# Patient Record
Sex: FEMALE | Race: WHITE | NOT HISPANIC OR LATINO | Employment: FULL TIME | URBAN - METROPOLITAN AREA
[De-identification: names, ages, dates, MRNs, and addresses within clinical notes are randomized per-mention and may not be internally consistent; named-entity substitution may affect disease eponyms.]

---

## 2019-04-16 ENCOUNTER — PROCEDURE VISIT (OUTPATIENT)
Dept: OBSTETRICS AND GYNECOLOGY | Facility: CLINIC | Age: 39
End: 2019-04-16

## 2019-04-16 ENCOUNTER — OFFICE VISIT (OUTPATIENT)
Dept: OBSTETRICS AND GYNECOLOGY | Facility: CLINIC | Age: 39
End: 2019-04-16

## 2019-04-16 VITALS
HEIGHT: 68 IN | SYSTOLIC BLOOD PRESSURE: 132 MMHG | BODY MASS INDEX: 36.07 KG/M2 | DIASTOLIC BLOOD PRESSURE: 80 MMHG | WEIGHT: 238 LBS

## 2019-04-16 DIAGNOSIS — Z11.51 SPECIAL SCREENING EXAMINATION FOR HUMAN PAPILLOMAVIRUS (HPV): ICD-10-CM

## 2019-04-16 DIAGNOSIS — R10.2 PELVIC PAIN: ICD-10-CM

## 2019-04-16 DIAGNOSIS — Z30.011 ENCOUNTER FOR INITIAL PRESCRIPTION OF CONTRACEPTIVE PILLS: ICD-10-CM

## 2019-04-16 DIAGNOSIS — R10.2 PELVIC PAIN: Primary | ICD-10-CM

## 2019-04-16 DIAGNOSIS — E28.2 PCOS (POLYCYSTIC OVARIAN SYNDROME): ICD-10-CM

## 2019-04-16 DIAGNOSIS — L70.8 OTHER ACNE: ICD-10-CM

## 2019-04-16 DIAGNOSIS — E66.09 CLASS 2 OBESITY DUE TO EXCESS CALORIES WITHOUT SERIOUS COMORBIDITY WITH BODY MASS INDEX (BMI) OF 36.0 TO 36.9 IN ADULT: ICD-10-CM

## 2019-04-16 DIAGNOSIS — Z01.419 PAP SMEAR, LOW-RISK: Primary | ICD-10-CM

## 2019-04-16 DIAGNOSIS — Z01.419 ENCOUNTER FOR WELL WOMAN EXAM: ICD-10-CM

## 2019-04-16 LAB
B-HCG UR QL: NEGATIVE
BILIRUB BLD-MCNC: NEGATIVE MG/DL
CLARITY, POC: CLEAR
COLOR UR: YELLOW
GLUCOSE UR STRIP-MCNC: NEGATIVE MG/DL
INTERNAL NEGATIVE CONTROL: NEGATIVE
INTERNAL POSITIVE CONTROL: POSITIVE
KETONES UR QL: NEGATIVE
LEUKOCYTE EST, POC: NEGATIVE
Lab: NORMAL
NITRITE UR-MCNC: NEGATIVE MG/ML
PH UR: 5 [PH] (ref 5–8)
PROT UR STRIP-MCNC: NEGATIVE MG/DL
RBC # UR STRIP: NEGATIVE /UL
SP GR UR: 1 (ref 1–1.03)
UROBILINOGEN UR QL: NORMAL

## 2019-04-16 PROCEDURE — 81025 URINE PREGNANCY TEST: CPT | Performed by: OBSTETRICS & GYNECOLOGY

## 2019-04-16 PROCEDURE — 81002 URINALYSIS NONAUTO W/O SCOPE: CPT | Performed by: OBSTETRICS & GYNECOLOGY

## 2019-04-16 PROCEDURE — 99386 PREV VISIT NEW AGE 40-64: CPT | Performed by: OBSTETRICS & GYNECOLOGY

## 2019-04-16 PROCEDURE — 76830 TRANSVAGINAL US NON-OB: CPT | Performed by: OBSTETRICS & GYNECOLOGY

## 2019-04-16 RX ORDER — NORGESTIMATE AND ETHINYL ESTRADIOL 0.25-0.035
1 KIT ORAL DAILY
Qty: 1 PACKAGE | Refills: 11 | Status: SHIPPED | OUTPATIENT
Start: 2019-04-16 | End: 2020-12-18 | Stop reason: SINTOL

## 2019-04-16 RX ORDER — CETIRIZINE HYDROCHLORIDE 10 MG/1
10 TABLET ORAL DAILY
Status: ON HOLD | COMMUNITY
End: 2022-04-21

## 2019-04-16 RX ORDER — FLUOXETINE 10 MG/1
10 CAPSULE ORAL DAILY
COMMUNITY
End: 2020-12-18

## 2019-04-16 NOTE — PROGRESS NOTES
"GYN Annual Exam     CC- Here for annual exam.     Pt new to practice? yes  Pt new to me? yes     HPI: History of Present Illness      Tamara Holstein is a 38 y.o. female who presents for annual well woman exam. Patient's last menstrual period was 2019. Pt needs annual, pap, mammo.  Pt has hx PCOS and is having pelvic pain.  \"Something is not right\"    MAMMOGRAM UP TO DATE IF AGE APPROPRIATE?  no    COLONOSCOPY UP TO DATE IF AGE APPROPRIATE? no    Fhx breast cancer? Mother, maternal grandmother    Fhx ovarian cancer? no    Fhx colon cancer? no    Invitae testing offered? Yes, pt declined.       PMHX:  Patient Active Problem List   Diagnosis   • Obesity due to excess calories   • Other acne   • PCOS (polycystic ovarian syndrome)   • Pelvic pain   • Encounter for initial prescription of contraceptive pills   ; otherwise none    OB History      Para Term  AB Living    0 0 0 0 0 0    SAB TAB Ectopic Molar Multiple Live Births    0 0 0 0 0 0            History reviewed. No pertinent past medical history.    History reviewed. No pertinent surgical history.      Current Outpatient Medications:   •  FLUoxetine (PROzac) 10 MG capsule, Take 10 mg by mouth Daily., Disp: , Rfl:   •  cetirizine (zyrTEC) 10 MG tablet, Take 10 mg by mouth Daily., Disp: , Rfl:   •  norgestimate-ethinyl estradiol (SPRINTEC 28) 0.25-35 MG-MCG per tablet, Take 1 tablet by mouth Daily., Disp: 1 package, Rfl: 11    Allergies   Allergen Reactions   • Sulfa Antibiotics Hives       Social History     Tobacco Use   • Smoking status: Not on file   Substance Use Topics   • Alcohol use: Not on file   • Drug use: Not on file       I advised Jo of the risks of continuing to use tobacco, and I provided her with tobacco cessation educational materials in the After Visit Summary.           History reviewed. No pertinent family history.    Review of Systems        EXAM:  /80   Ht 172.7 cm (68\")   Wt 108 kg (238 lb)   LMP 2019   " Breastfeeding? No   BMI 36.19 kg/m²     Physical Exam   Constitutional: She is oriented to person, place, and time. She appears well-developed and well-nourished.   HENT:   Head: Normocephalic and atraumatic.   Neck: Normal range of motion. Neck supple. No thyromegaly present.   Cardiovascular: Normal rate and regular rhythm.   Pulmonary/Chest: Effort normal and breath sounds normal. Right breast exhibits no mass and no nipple discharge. Left breast exhibits no mass and no nipple discharge. Breasts are symmetrical. There is no breast swelling.   Abdominal: Soft. Bowel sounds are normal. She exhibits no distension and no mass. There is no tenderness. There is no rebound and no guarding.   Genitourinary: Vagina normal and uterus normal. No breast tenderness, discharge or bleeding. Pelvic exam was performed with patient prone. There is no lesion on the right labia. There is no lesion on the left labia. Cervix exhibits no motion tenderness and no discharge. Right adnexum displays no mass. Left adnexum displays no mass.   Genitourinary Comments: cx wnl, pap done   Musculoskeletal: Normal range of motion. She exhibits no edema.   Neurological: She is alert and oriented to person, place, and time.   Skin: Skin is warm and dry.   Breasts wnl bilaterally   Psychiatric: She has a normal mood and affect. Her behavior is normal. Judgment and thought content normal.   Nursing note and vitals reviewed.         As part of wellness and prevention, the following topics were discussed with the patient:  []  Nutrition  []  Physical activity/regular exercise   [x]  Healthy weight  []  Injury prevention  [x]  Substance misuse/abuse  []  Sexual behavior  []  STD prevention  []  Contaception  []  Dental health  [x]  Mental health  []  Immunization  [x]  Encouraged SBE     Counseling and guidance done:  Nutrition, physical activity, healthy weight, injury prevention, misuse of tobacco, alcohol and drugs, sexual behavior and STDs,  contraception, dental health, mental health, immunizations breast cancer screening and exams.    Assessment     1) GYN annual well woman exam.   2) PAP done today? yes  3) problems addressed: pelvic pain       Plan       Follow up prn and one year.    Jo was seen today for gynecologic exam.    Diagnoses and all orders for this visit:    Pap smear, low-risk  -     POC Urinalysis Dipstick  -     POC Pregnancy, Urine  -     Pap IG, HPV-hr    Special screening examination for human papillomavirus (HPV)  -     POC Urinalysis Dipstick  -     POC Pregnancy, Urine  -     Pap IG, HPV-hr    Encounter for well woman exam  -     POC Urinalysis Dipstick  -     POC Pregnancy, Urine  -     Pap IG, HPV-hr  -     Mammo Screening Digital Tomosynthesis Bilateral With CAD; Future    Class 2 obesity due to excess calories without serious comorbidity with body mass index (BMI) of 36.0 to 36.9 in adult    Other acne    PCOS (polycystic ovarian syndrome)    Pelvic pain    Encounter for initial prescription of contraceptive pills    Other orders  -     norgestimate-ethinyl estradiol (SPRINTEC 28) 0.25-35 MG-MCG per tablet; Take 1 tablet by mouth Daily.      U/s wnl.  Reviewed with pt: The uterus is retroverted and appears normal in shape and contour.  The EL measures 1.2cm.  Both ovaries are seen and appear normal in size and shape. There are small (<1cm) simple cyst seen within both  ovaries. ?polycystic     will start OCs for pelvic pain and ovulation suppression and acne and treatment of PCOS.  Instructions and precautions given.     RTO Return in about 1 year (around 4/16/2020) for Annual physical.      Yogi Patten MD  [unfilled]  12:05 PM

## 2019-04-19 LAB
CYTOLOGIST CVX/VAG CYTO: ABNORMAL
CYTOLOGY CVX/VAG DOC THIN PREP: ABNORMAL
DX ICD CODE: ABNORMAL
DX ICD CODE: ABNORMAL
HIV 1 & 2 AB SER-IMP: ABNORMAL
HPV I/H RISK 1 DNA CVX QL PROBE+SIG AMP: POSITIVE
OTHER STN SPEC: ABNORMAL
PATH REPORT.FINAL DX SPEC: ABNORMAL
PATHOLOGIST CVX/VAG CYTO: ABNORMAL
RECOM F/U CVX/VAG CYTO: ABNORMAL
STAT OF ADQ CVX/VAG CYTO-IMP: ABNORMAL

## 2019-04-25 PROBLEM — R87.610 ASCUS WITH POSITIVE HIGH RISK HPV CERVICAL: Status: ACTIVE | Noted: 2019-04-25

## 2019-04-25 PROBLEM — R87.810 ASCUS WITH POSITIVE HIGH RISK HPV CERVICAL: Status: ACTIVE | Noted: 2019-04-25

## 2019-10-18 ENCOUNTER — HOSPITAL ENCOUNTER (OUTPATIENT)
Dept: PHYSICAL THERAPY | Facility: HOSPITAL | Age: 39
Setting detail: THERAPIES SERIES
Discharge: HOME | End: 2019-10-18

## 2019-10-18 DIAGNOSIS — M72.2 PLANTAR FASCIITIS OF LEFT FOOT: Primary | ICD-10-CM

## 2019-10-18 PROCEDURE — 97161 PT EVAL LOW COMPLEX 20 MIN: CPT

## 2019-10-18 PROCEDURE — 97110 THERAPEUTIC EXERCISES: CPT

## 2019-10-18 NOTE — THERAPY EVALUATION
Outpatient Physical Therapy Ortho Initial Evaluation  [facility]     Patient Name: Tamara Holstein  : 1980  MRN: [id number]  Today's Date: 10/18/2019      Visit Date: 10/18/2019    Patient Active Problem List   Diagnosis   • Obesity due to excess calories   • Other acne   • PCOS (polycystic ovarian syndrome)   • Pelvic pain   • Encounter for initial prescription of contraceptive pills   • ASCUS with positive high risk HPV cervical        No past medical history on file.     No past surgical history on file.    Visit Dx:     ICD-10-CM ICD-9-CM   1. Plantar fasciitis of left foot M72.2 728.71         Patient History     Row Name 10/18/19 1200             History    Chief Complaint  Difficulty Walking;Difficulty with daily activities;Joint stiffness;Joint swelling;Muscle tenderness;Muscle weakness;Pain  -AS      Type of Pain  Ankle pain;Foot pain Left  -AS      Date Current Problem(s) Began  19  -AS      Brief Description of Current Complaint  Patient states she started having left heel and foot pain about 4 months ago. She states she limped around for several months before going to see her MD. She had an MRI and a tear in her plantar fascia was found around its attachment site on the calcaneus. She was placed in a walking boot and referred to PT. She returns to her MD on 19.   -AS      Patient's Rating of General Health  Good  -AS      Hand Dominance  right-handed  -AS      Occupation/sports/leisure activities  Desk Job, Working Out  -AS      Patient seeing anyone else for problem(s)?  Dr. Cruz  -AS      How has patient tried to help current problem?  rest, exercises, walking boot, Advil  -AS      What clinical tests have you had for this problem?  MRI  -AS      Results of Clinical Tests  tear of plantar fascia of left foot  -AS         Pain     Pain Location  Foot  -AS      Pain at Present  4  -AS      Pain at Best  0  -AS      Pain at Worst  8  -AS      Pain Frequency  Intermittent  -AS       Pain Description  Aching  -AS      What Performance Factors Make the Current Problem(s) WORSE?  walking  -AS      What Performance Factors Make the Current Problem(s) BETTER?  rest  -AS         Daily Activities    Primary Language  English  -AS      Are you able to read  Yes  -AS      Are you able to write  Yes  -AS      How does patient learn best?  Listening;Reading  -AS      Teaching needs identified  Home Exercise Program;Management of Condition  -AS      Patient is concerned about/has problems with  Difficulty with self care (i.e. bathing, dressing, toileting:;Flexibility;Performing home management (household chores, shopping, care of dependents);Performing job responsibilities/community activities (work, school,;Performing sports, recreation, and play activities;Walking  -AS      Does patient have problems with the following?  None  -AS      Barriers to learning  None  -AS      Pt Participated in POC and Goals  Yes  -AS         Safety    Are you being hurt, hit, or frightened by anyone at home or in your life?  No  -AS      Are you being neglected by a caregiver  No  -AS        User Key  (r) = Recorded By, (t) = Taken By, (c) = Cosigned By    Initials Name Provider Type    AS Mason Delarosa, PT Physical Therapist          PT Ortho     Row Name 10/18/19 1200       Precautions and Contraindications    Precautions/Limitations  no known precautions/limitations  -AS       Posture/Observations    Posture- WNL  Posture is WNL  -AS       Foot/Ankle Palpation    Plantar Fascia  Left:;Tender  -AS    Achilles' Tendon  Left:;Tender  -AS    Posterior Tibialis  Left:;Tender  -AS       Left Lower Ext    Lt Ankle Dorsiflexion AROM  8  -AS    Lt Ankle Plantarflexion AROM  45  -AS    Lt Ankle Inversion AROM  30  -AS    Lt Ankle Eversion AROM  11  -AS       MMT Left Lower Ext    Lt Ankle Plantarflexion MMT, Gross Movement  (4/5) good  -AS    Lt Ankle Dorsiflexion MMT, Gross Movement  (4+/5) good plus  -AS    Lt Ankle  Subtalar Inversion MMT, Gross Movement  (4-/5) good minus  -AS    Lt Ankle Subtalar Eversion MMT, Gross Movement  (4/5) good  -AS       Sensation    Sensation WNL?  WNL  -AS    Light Touch  No apparent deficits  -AS       Lower Extremity Flexibility    Gastrocnemius  Left:;Mildly limited  -AS    Soleus  Left:;Moderately limited  -AS       Gait/Stairs Assessment/Training    Comment (Gait/Stairs)  Patient is ambulating with a walking boot on her LLE. Patient is ambulating with an antalgic gait pattern with limited heel strike, shortened step and stride length on her left side.  -AS      User Key  (r) = Recorded By, (t) = Taken By, (c) = Cosigned By    Initials Name Provider Type    AS Mason Delarosa, PT Physical Therapist                      Therapy Education  Given: HEP, Symptoms/condition management, Pain management, Edema management  Program: New  How Provided: Verbal, Demonstration, Written  Provided to: Patient  Level of Understanding: Teach back education performed, Verbalized, Demonstrated     PT OP Goals     Row Name 10/18/19 1200          PT Short Term Goals    STG Date to Achieve  11/01/19  -AS     STG 1  Patient to demonstrate compliance with her initial HEP for flexibility, ROM, and strengthening.  -AS     STG 2  Patient to report left foot and heel pain on VAS of 4-5/10 at worst with activity.  -AS     STG 3  Patient to demonstrate improved left ankle strength to 4+/5 in all planes.  -AS     STG 4  Patient to ambulate community distances with a tennis shoe and a normal heel to toe gait pattern.  -AS        Long Term Goals    LTG Date to Achieve  11/15/19  -AS     LTG 1  Patient to demonstrate compliance with her advanced HEP for flexibility, ROM, and strengthening.  -AS     LTG 2  Patient to report left foot and heel pain on VAS of 0-1/10 at worst with activity.  -AS     LTG 3  Patient to demonstrate improved left ankle strength to 5/5 in all planes.  -AS     LTG 4  Patient to demonstrate improved  left ankle ROM to WNL in all planes.  -AS     LTG 5  Patient to report improved function and decreased pain on LEFS by >10-15 points.  -AS        Time Calculation    PT Goal Re-Cert Due Date  11/15/19  -AS       User Key  (r) = Recorded By, (t) = Taken By, (c) = Cosigned By    Initials Name Provider Type    AS Mason eDlarosa, PT Physical Therapist          PT Assessment/Plan     Row Name 10/18/19 1200          PT Assessment    Functional Limitations  Impaired gait;Impaired locomotion;Limitation in home management;Limitations in community activities;Performance in leisure activities;Performance in sport activities;Performance in work activities  -AS     Impairments  Gait;Impaired flexibility;Joint mobility;Muscle strength;Pain;Range of motion  -AS     Assessment Comments  Patient reports to outpatient PT with a diagnosis of left plantar fasciitis. Patient is wearing walking boot on her left foot. Patient has limited left ankle ROM, limited left foot/ankle strength, and increased foot and heel pain with activity, especially with her first steps in the morning. Patient has limited function at this time secondary to the above.  -AS     Please refer to paper survey for additional self-reported information  Yes  -AS     Rehab Potential  Good  -AS     Patient/caregiver participated in establishment of treatment plan and goals  Yes  -AS     Patient would benefit from skilled therapy intervention  Yes  -AS        PT Plan    PT Frequency  2x/week  -AS     Predicted Duration of Therapy Intervention (Therapy Eval)  3-4 weeks  -AS     Planned CPT's?  PT RE-EVAL: 19234;PT THER PROC EA 15 MIN: 64154;PT NEUROMUSC RE-EDUCATION EA 15 MIN: 13914;PT THER ACT EA 15 MIN: 02118;PT MANUAL THERAPY EA 15 MIN: 25371;PT GAIT TRAINING EA 15 MIN: 15799;PT ELECTRICAL STIM UNATTEND: ;PT ULTRASOUND EA 15 MIN: 69144;PT HOT/COLD PACK WC NONMCARE: 27447;PT IONTOPHORESIS EA 15 MIN: 14731  -AS       User Key  (r) = Recorded By, (t) = Taken  By, (c) = Cosigned By    Initials Name Provider Type    AS Mason Delarosa, PT Physical Therapist          Modalities     Row Name 10/18/19 1200             Ultrasound 58182    Location  Plantar Fascia  -AS      Duty Cycle  50  -AS      Frequency  3.0 MHz  -AS      Intensity - Wts/cm  1.5  -AS         Iontophoresis 29091    Milliamps  40  -AS      MA/Min  4  -AS      Dexamethasone used  Yes  -AS      Patch Type  Large  -AS        User Key  (r) = Recorded By, (t) = Taken By, (c) = Cosigned By    Initials Name Provider Type    AS Mason Delarosa, PT Physical Therapist        OP Exercises     Row Name 10/18/19 1200             Subjective Pain    Able to rate subjective pain?  yes  -AS      Pre-Treatment Pain Level  4  -AS      Post-Treatment Pain Level  3  -AS         Exercise 1    Exercise Name 1  NWB Gastroc/Soleus Stretch  -AS      Reps 1  10  -AS      Time 1  10 sec hold each  -AS         Exercise 2    Exercise Name 2  Seated Great Toe Stretch  -AS      Reps 2  10  -AS      Time 2  10 sec hold each  -AS         Exercise 3    Exercise Name 3  Pro Stretch  -AS      Time 3  3 min  -AS         Exercise 4    Exercise Name 4  Ankle PF vs Band  -AS      Reps 4  25  -AS      Additional Comments  Gold  -AS         Exercise 5    Exercise Name 5  Ankle IV vs Band  -AS      Reps 5  25  -AS      Additional Comments  Green  -AS         Exercise 6    Exercise Name 6  Towel Curls  -AS      Time 6  5 min  -AS      Additional Comments  1#  -AS        User Key  (r) = Recorded By, (t) = Taken By, (c) = Cosigned By    Initials Name Provider Type    AS Mason Delarosa, PT Physical Therapist                        Outcome Measure Options: Lower Extremity Functional Scale (LEFS)  Lower Extremity Functional Index  Any of your usual work, housework or school activities: A little bit of difficulty  Your usual hobbies, recreational or sporting activities: A little bit of difficulty  Getting into or out of the bath: No  difficulty  Walking between rooms: Moderate difficulty  Putting on your shoes or socks: No difficulty  Squatting: A little bit of difficulty  Lifting an object, like a bag of groceries from the floor: No difficulty  Performing light activities around your home: No difficulty  Performing heavy activities around your home: Moderate difficulty  Getting into or out of a car: No difficulty  Walking 2 blocks: Moderate difficulty  Walking a mile: Quite a bit of difficulty  Going up or down 10 stairs (about 1 flight of stairs): Quite a bit of difficulty  Standing for 1 hour: Moderate difficulty  Sitting for 1 hour: No difficulty  Running on even ground: Extreme difficulty or unable to perform activity  Running on uneven ground: Extreme difficulty or unable to perform activity  Making sharp turns while running fast: Extreme difficulty or unable to perform activity  Hopping: Extreme difficulty or unable to perform activity  Rolling over in bed: No difficulty  Total: 47      Time Calculation:     Start Time: 1136  Stop Time: 1242  Time Calculation (min): 66 min     Therapy Charges for Today     Code Description Service Date Service Provider Modifiers Qty    13225258441 HC PT EVAL LOW COMPLEXITY 2 10/18/2019 Mason Delarosa, PT GP 1    76017917962 HC PT THER PROC EA 15 MIN 10/18/2019 Mason Delarosa, PT GP 2          PT G-Codes  Outcome Measure Options: Lower Extremity Functional Scale (LEFS)  Total: 47         Mason Delarosa, PT  10/18/2019

## 2019-10-21 ENCOUNTER — HOSPITAL ENCOUNTER (OUTPATIENT)
Dept: PHYSICAL THERAPY | Facility: HOSPITAL | Age: 39
Setting detail: THERAPIES SERIES
Discharge: HOME | End: 2019-10-21

## 2019-10-21 DIAGNOSIS — M72.2 PLANTAR FASCIITIS OF LEFT FOOT: Primary | ICD-10-CM

## 2019-10-21 PROCEDURE — 97110 THERAPEUTIC EXERCISES: CPT

## 2019-10-21 NOTE — THERAPY TREATMENT NOTE
Outpatient Physical Therapy [facility]o Treatment Note  [facility]     Patient Name: Tamara Holstein  : 1980  MRN: [id number]  Today's Date: 10/21/2019      Visit Date: 10/21/2019    Visit Dx:    ICD-10-CM ICD-9-CM   1. 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     No past surgical history on file.                    PT Assessment/Plan     Row Name 10/21/19 1130          PT Assessment    Assessment Comments  Pt tolerated treatment plan well with no reports of increased symptoms.   -KM        PT Plan    PT Plan Comments  Continue per POC  -KM       User Key  (r) = Recorded By, (t) = Taken By, (c) = Cosigned By    Initials Name Provider Type    Terri Paredes PTA Physical Therapy Assistant          Modalities     Row Name 10/21/19 1130             Subjective Comments    Subjective Comments  Pt states she experienced increased pain over the weekend with increased activity, but symptoms have resolved and her foot feels good today.   -KM         Ultrasound 96918    Location  Plantar Fascia  -KM      Duty Cycle  50  -KM      Frequency  3.0 MHz  -KM      Intensity - Wts/cm  1.5  -KM         Iontophoresis 85404    Milliamps  40  -KM      MA/Min  4  -KM      Dexamethasone used  Yes  -KM      Patch Type  Large  -KM        User Key  (r) = Recorded By, (t) = Taken By, (c) = Cosigned By    Initials Name Provider Type    Terri Paredes PTA Physical Therapy Assistant        OP Exercises     Row Name 10/21/19 1130             Subjective Comments    Subjective Comments  Pt states she experienced increased pain over the weekend with increased activity, but symptoms have resolved and her foot feels good today.   -KM          Exercise 1    Exercise Name 1  NWB Gastroc/Soleus Stretch  -KM      Reps 1  10  -KM      Time 1  10 sec hold each  -KM         Exercise 2    Exercise Name 2  Seated Great Toe Stretch  -KM      Reps 2  10  -KM      Time 2  10 sec hold each  -KM         Exercise 3    Exercise Name 3  Pro Stretch  -KM      Time 3  3 min  -KM         Exercise 4    Exercise Name 4  Ankle PF vs Band  -KM      Reps 4  30  -KM      Time 4  Gold  -KM         Exercise 5    Exercise Name 5  Ankle IV vs Band  -KM      Reps 5  30  -KM      Time 5  Green  -KM         Exercise 6    Exercise Name 6  Towel Curls  -KM      Time 6  5 min  -KM      Additional Comments  1#  -KM        User Key  (r) = Recorded By, (t) = Taken By, (c) = Cosigned By    Initials Name Provider Type    Terri Paredes PTA Physical Therapy Assistant                                          Time Calculation:   Start Time: 1130  Stop Time: 1225  Time Calculation (min): 55 min  Therapy Charges for Today     Code Description Service Date Service Provider Modifiers Qty    14002981153 HC PT THER PROC EA 15 MIN 10/21/2019 Terri Saenz PTA GP 1                    Terri Saenz PTA  10/21/2019

## 2019-10-24 ENCOUNTER — HOSPITAL ENCOUNTER (OUTPATIENT)
Dept: PHYSICAL THERAPY | Facility: HOSPITAL | Age: 39
Setting detail: THERAPIES SERIES
Discharge: HOME | End: 2019-10-24

## 2019-10-24 DIAGNOSIS — M72.2 PLANTAR FASCIITIS OF LEFT FOOT: Primary | ICD-10-CM

## 2019-10-24 PROCEDURE — 97110 THERAPEUTIC EXERCISES: CPT

## 2019-10-24 PROCEDURE — 97035 APP MDLTY 1+ULTRASOUND EA 15: CPT

## 2019-10-24 NOTE — THERAPY TREATMENT NOTE
Outpatient Physical Therapy [facility] Treatment Note  [facility]     Patient Name: Tamara Holstein  : 1980  MRN: [id number]  Today's Date: 10/24/2019      Visit Date: 10/24/2019    Visit Dx:    ICD-10-CM ICD-9-CM   1. 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     No past surgical history on file.                    PT Assessment/Plan     Row Name 10/24/19 1200          PT Assessment    Assessment Comments  Patient has improved tolerance to her exercises today. She continues to complain of left heel pain.  -AS        PT Plan    PT Plan Comments  Continue with current treatment plan.  -AS       User Key  (r) = Recorded By, (t) = Taken By, (c) = Cosigned By    Initials Name Provider Type    AS Mason Delarosa, PT Physical Therapist          Modalities     Row Name 10/24/19 1200             Ultrasound 63342    Location  Plantar Fascia  -AS      Duty Cycle  50  -AS      Frequency  3.0 MHz  -AS      Intensity - Wts/cm  1.5  -AS         Iontophoresis 05197    Milliamps  40  -AS      MA/Min  4  -AS      Dexamethasone used  Yes  -AS      Patch Type  Large  -AS        User Key  (r) = Recorded By, (t) = Taken By, (c) = Cosigned By    Initials Name Provider Type    AS Mason Delarosa, PT Physical Therapist        OP Exercises     Row Name 10/24/19 1200             Subjective Comments    Subjective Comments  Patient states she is feeling a little better.   -AS         Exercise 1    Exercise Name 1  NWB Gastroc/Soleus Stretch  -AS      Reps 1  10  -AS      Time 1  10 sec hold each  -AS         Exercise 2    Exercise Name 2  Seated Great Toe Stretch  -AS      Reps 2  10  -AS      Time 2  10 sec hold each  -AS          Exercise 3    Exercise Name 3  Pro Stretch  -AS      Time 3  3 min  -AS         Exercise 4    Exercise Name 4  Ankle PF vs Band  -AS      Reps 4  40  -AS      Time 4  Gold  -AS         Exercise 5    Exercise Name 5  Ankle IV vs Band  -AS      Reps 5  25  -AS      Time 5  Blue  -AS         Exercise 6    Exercise Name 6  Towel Curls  -AS      Time 6  5 min  -AS      Additional Comments  1#  -AS        User Key  (r) = Recorded By, (t) = Taken By, (c) = Cosigned By    Initials Name Provider Type    AS Mason Delarosa, PT Physical Therapist                                          Time Calculation:   Start Time: 1130  Stop Time: 1224  Time Calculation (min): 54 min  Therapy Charges for Today     Code Description Service Date Service Provider Modifiers Qty    39032788593  PT THER PROC EA 15 MIN 10/24/2019 Mason Delarosa, PT GP 1    [id number]  PT ULTRASOUND EA 15 MIN 10/24/2019 Mason Delarosa, PT GP 1                    Mason Delarosa, PT  10/24/2019

## 2019-10-30 ENCOUNTER — HOSPITAL ENCOUNTER (OUTPATIENT)
Dept: PHYSICAL THERAPY | Facility: HOSPITAL | Age: 39
Setting detail: THERAPIES SERIES
Discharge: HOME | End: 2019-10-30

## 2019-10-30 DIAGNOSIS — M72.2 PLANTAR FASCIITIS OF LEFT FOOT: Primary | ICD-10-CM

## 2019-10-30 PROCEDURE — 97110 THERAPEUTIC EXERCISES: CPT

## 2019-10-30 PROCEDURE — 97033 APP MDLTY 1+IONTPHRSIS EA 15: CPT

## 2019-10-30 NOTE — THERAPY TREATMENT NOTE
Outpatient Physical Therapy Ortho Treatment Note  [facility]     Patient Name: Tamara Holstein  : 1980  MRN: [id number]  Today's Date: 10/30/2019      Visit Date: 10/30/2019    Visit Dx:    ICD-10-CM ICD-9-CM   1. 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     No past surgical history on file.                    PT Assessment/Plan     Row Name 10/30/19 1100          PT Assessment    Assessment Comments  Patient continues to report improvements inb her symptoms.he also demonstrates improved strength as well as tolerance to her exercises.  -AS        PT Plan    PT Plan Comments  Continue with current treatment plan.  -AS       User Key  (r) = Recorded By, (t) = Taken By, (c) = Cosigned By    Initials Name Provider Type    AS Mason Delarosa, PT Physical Therapist          Modalities     Row Name 10/30/19 1100             Ultrasound 45302    Location  Plantar Fascia  -AS      Duty Cycle  50  -AS      Frequency  3.0 MHz  -AS      Intensity - Wts/cm  1.5  -AS         Iontophoresis 87529    Milliamps  40  -AS      MA/Min  4  -AS      Dexamethasone used  Yes  -AS      Patch Type  Large  -AS        User Key  (r) = Recorded By, (t) = Taken By, (c) = Cosigned By    Initials Name Provider Type    AS Mason Delarosa, PT Physical Therapist        OP Exercises     Row Name 10/30/19 1100             Subjective Comments    Subjective Comments  Patient states that her foot is feeling better but she is still in pain, especially when outside of her walking boot.  -AS         Exercise 1    Exercise Name 1  NWB Gastroc/Soleus Stretch  -AS      Reps 1  10  -AS      Time 1  10 sec hold each  -AS         Exercise 2     Exercise Name 2  Seated Great Toe Stretch  -AS      Reps 2  10  -AS      Time 2  10 sec hold each  -AS         Exercise 3    Exercise Name 3  Pro Stretch  -AS      Time 3  3 min  -AS         Exercise 4    Exercise Name 4  Ankle PF vs Band  -AS      Reps 4  40  -AS      Time 4  Gold  -AS         Exercise 5    Exercise Name 5  Ankle IV vs Band  -AS      Reps 5  30  -AS      Time 5  Blue  -AS         Exercise 6    Exercise Name 6  Towel Curls  -AS      Time 6  5 min  -AS      Additional Comments  1#  -AS        User Key  (r) = Recorded By, (t) = Taken By, (c) = Cosigned By    Initials Name Provider Type    AS Mason Delarosa, PT Physical Therapist                                          Time Calculation:   Start Time: 1057  Stop Time: 1151  Time Calculation (min): 54 min  Therapy Charges for Today     Code Description Service Date Service Provider Modifiers Qty    88748750715  PT THER PROC EA 15 MIN 10/30/2019 Mason Delarosa, PT GP 1    75643560375 HC PT IONTOPHORESIS EA 15 MIN 10/30/2019 Mason Delarosa, PT GP 1                    Mason Delarosa, PT  10/30/2019

## 2019-11-01 ENCOUNTER — HOSPITAL ENCOUNTER (OUTPATIENT)
Dept: PHYSICAL THERAPY | Facility: HOSPITAL | Age: 39
Setting detail: THERAPIES SERIES
Discharge: HOME | End: 2019-11-01

## 2019-11-01 DIAGNOSIS — M72.2 PLANTAR FASCIITIS OF LEFT FOOT: Primary | ICD-10-CM

## 2019-11-01 PROCEDURE — 97035 APP MDLTY 1+ULTRASOUND EA 15: CPT

## 2019-11-01 PROCEDURE — 97110 THERAPEUTIC EXERCISES: CPT

## 2019-11-01 NOTE — THERAPY TREATMENT NOTE
Outpatient Physical Therapy Ortho Treatment Note  [facility]     Patient Name: Tamara Holstein  : 1980  MRN: [id number]  Today's Date: 2019      Visit Date: 2019    Visit Dx:    ICD-10-CM ICD-9-CM   1. 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     No past surgical history on file.                    PT Assessment/Plan     Row Name 19 1100          PT Assessment    Assessment Comments  Pt demonstrates improved tolerances with prescribed exercises and reports decreased symptoms post modalities  -KM        PT Plan    PT Plan Comments  Continue per POC  -KM       User Key  (r) = Recorded By, (t) = Taken By, (c) = Cosigned By    Initials Name Provider Type    Terri Paredes PTA Physical Therapy Assistant          Modalities     Row Name 19 1100             Subjective Comments    Subjective Comments  Pt states she will experience symptoms at random times but has continue to improve.   -KM         Ultrasound 36316    Location  Plantar Fascia  -KM      Duty Cycle  50  -KM      Frequency  3.0 MHz  -KM      Intensity - Wts/cm  1.5  -KM         Iontophoresis 34318    Milliamps  40  -KM      MA/Min  4  -KM      Dexamethasone used  Yes  -KM      Patch Type  Large  -KM        User Key  (r) = Recorded By, (t) = Taken By, (c) = Cosigned By    Initials Name Provider Type    Terri Paredes PTA Physical Therapy Assistant        OP Exercises     Row Name 19 1100             Subjective Comments    Subjective Comments  Pt states she will experience symptoms at random times but has continue to improve.   -KM         Exercise 1    Exercise Name 1  NWB Gastroc/Soleus Stretch  -KM      Reps 1   10  -KM      Time 1  10 sec hold each  -KM         Exercise 2    Exercise Name 2  Seated Great Toe Stretch  -KM      Reps 2  10  -KM      Time 2  10 sec hold each  -KM         Exercise 3    Exercise Name 3  Pro Stretch  -KM      Time 3  3 min  -KM         Exercise 4    Exercise Name 4  Ankle PF vs Band  -KM      Reps 4  40  -KM      Time 4  Gold  -KM         Exercise 5    Exercise Name 5  Ankle IV vs Band  -KM      Reps 5  40  -KM      Time 5  Blue  -KM         Exercise 6    Exercise Name 6  Towel Curls  -KM      Time 6  5 min  -KM      Additional Comments  2#  -KM        User Key  (r) = Recorded By, (t) = Taken By, (c) = Cosigned By    Initials Name Provider Type    Terri Paredes PTA Physical Therapy Assistant                                          Time Calculation:   Start Time: 1100  Stop Time: 1150  Time Calculation (min): 50 min  Therapy Charges for Today     Code Description Service Date Service Provider Modifiers Qty    50811067941 HC PT THER PROC EA 15 MIN 11/1/2019 Terri Saenz PTA GP 1    77843969606 HC PT ULTRASOUND EA 15 MIN 11/1/2019 Terri Saenz PTA GP 1                    Terri Saenz PTA  11/1/2019

## 2019-11-06 ENCOUNTER — HOSPITAL ENCOUNTER (OUTPATIENT)
Dept: PHYSICAL THERAPY | Facility: HOSPITAL | Age: 39
Setting detail: THERAPIES SERIES
Discharge: HOME | End: 2019-11-06

## 2019-11-06 DIAGNOSIS — M72.2 PLANTAR FASCIITIS OF LEFT FOOT: Primary | ICD-10-CM

## 2019-11-06 PROCEDURE — 97035 APP MDLTY 1+ULTRASOUND EA 15: CPT

## 2019-11-06 PROCEDURE — 97110 THERAPEUTIC EXERCISES: CPT

## 2019-11-06 NOTE — THERAPY TREATMENT NOTE
Outpatient Physical Therapy Ortho Treatment Note  [facility]     Patient Name: Tamara Holstein  : 1980  MRN: [id number]  Today's Date: 2019      Visit Date: 2019    Visit Dx:    ICD-10-CM ICD-9-CM   1. 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     No past surgical history on file.                    PT Assessment/Plan     Row Name 19 1057          PT Assessment    Assessment Comments  Decreased point tenderness noted along medial arch. Pt able to tolerate and progress well with manual and strengthening exercises.   -KM        PT Plan    PT Plan Comments  Possible trial of KT tape. Continue with current treatment plan   -KM       User Key  (r) = Recorded By, (t) = Taken By, (c) = Cosigned By    Initials Name Provider Type    Terri Paredes, PTA Physical Therapy Assistant            OP Exercises     Row Name 19 1051             Subjective Comments    Subjective Comments  Pt states she notices improved tolerance with WB out of boot while at home and decreased point tenderness medial arch.   -KM         Exercise 1    Exercise Name 1  NWB Gastroc/Soleus Stretch  -KM      Reps 1  10  -KM      Time 1  10 sec hold each  -KM         Exercise 2    Exercise Name 2  Seated Great Toe Stretch  -KM      Reps 2  10  -KM      Time 2  10 sec hold each  -KM         Exercise 3    Exercise Name 3  Pro Stretch  -KM      Time 3  3 min  -KM         Exercise 4    Exercise Name 4  Ankle PF vs Band  -KM      Reps 4  40  -KM      Time 4  Gold  -KM         Exercise 5    Exercise Name 5  Ankle IV vs Band  -KM      Reps 5  25  -KM      Time 5  Black  -KM         Exercise 6    Exercise Name 6  Towel Curls  -KM       Time 6  5 min  -KM      Additional Comments  2#  -KM        User Key  (r) = Recorded By, (t) = Taken By, (c) = Cosigned By    Initials Name Provider Type    Terri Paredes PTA Physical Therapy Assistant                                          Time Calculation:   Start Time: 1055  Stop Time: 1146  Time Calculation (min): 51 min  Therapy Charges for Today     Code Description Service Date Service Provider Modifiers Qty    75263608137 HC PT THER PROC EA 15 MIN 11/6/2019 Terri Saenz PTA GP 1    74686554115 HC PT ULTRASOUND EA 15 MIN 11/6/2019 Terri Saenz PTA GP 1                    Terri Saenz PTA  11/6/2019

## 2019-11-08 ENCOUNTER — HOSPITAL ENCOUNTER (OUTPATIENT)
Dept: PHYSICAL THERAPY | Facility: HOSPITAL | Age: 39
Setting detail: THERAPIES SERIES
Discharge: HOME | End: 2019-11-08

## 2019-11-08 DIAGNOSIS — M72.2 PLANTAR FASCIITIS OF LEFT FOOT: Primary | ICD-10-CM

## 2019-11-08 PROCEDURE — 97110 THERAPEUTIC EXERCISES: CPT

## 2019-11-08 PROCEDURE — 97035 APP MDLTY 1+ULTRASOUND EA 15: CPT

## 2019-11-08 NOTE — THERAPY TREATMENT NOTE
Outpatient Physical Therapy Ortho Treatment Note  [facility]     Patient Name: Tamara Holstein  : 1980  MRN: [id number]  Today's Date: 2019      Visit Date: 2019    Visit Dx:    ICD-10-CM ICD-9-CM   1. 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     No past surgical history on file.                    PT Assessment/Plan     Row Name 19 1055          PT Assessment    Assessment Comments  Pt reports improved symptoms with prescribed exercises and modalities performed. Pt has f/u appointment with MD Monday  -        PT Plan    PT Plan Comments  Continue POC per MD. Progress as tolerated.   -KM       User Key  (r) = Recorded By, (t) = Taken By, (c) = Cosigned By    Initials Name Provider Type    Terri Paredes PTA Physical Therapy Assistant          Modalities     Row Name 19 1055             Ultrasound 12722    Location  Plantar Fascia  -KM      Duty Cycle  50  -KM      Frequency  3.0 MHz  -KM      Intensity - Wts/cm  1.5  -KM         Iontophoresis 05615    Milliamps  40  -KM      MA/Min  4  -KM      Dexamethasone used  Yes  -KM      Patch Type  Large  -KM        User Key  (r) = Recorded By, (t) = Taken By, (c) = Cosigned By    Initials Name Provider Type    Terri Paredes PTA Physical Therapy Assistant        OP Exercises     Row Name 19 1055             Subjective Comments    Subjective Comments  Pt states her foot continues to do well, states she has had decreased activity this week in regards to decreased workouts due to being sick. Pt states she is attending a weight lifting certification seminar this weekend and plans to particpate as much as she can with the boot  on.   -KM         Exercise 1    Exercise Name 1  NWB Gastroc/Soleus Stretch  -KM      Reps 1  10  -KM      Time 1  10 sec hold each  -KM         Exercise 2    Exercise Name 2  Seated Great Toe Stretch  -KM      Reps 2  10  -KM      Time 2  10 sec hold each  -KM         Exercise 3    Exercise Name 3  Pro Stretch  -KM      Time 3  3 min  -KM         Exercise 4    Exercise Name 4  Ankle PF vs Band  -KM      Reps 4  40  -KM      Time 4  Gold  -KM         Exercise 5    Exercise Name 5  Ankle IV vs Band  -KM      Reps 5  30  -KM      Time 5  Black  -KM         Exercise 6    Exercise Name 6  Towel Curls  -KM      Time 6  5 min  -KM      Additional Comments  2#  -KM        User Key  (r) = Recorded By, (t) = Taken By, (c) = Cosigned By    Initials Name Provider Type    Terri Paredes PTA Physical Therapy Assistant                                          Time Calculation:   Start Time: 1055  Stop Time: 1145  Time Calculation (min): 50 min  Therapy Charges for Today     Code Description Service Date Service Provider Modifiers Qty    24645520891 HC PT THER PROC EA 15 MIN 11/8/2019 Terri Saenz PTA GP 1    14259666575 HC PT ULTRASOUND EA 15 MIN 11/8/2019 Terri Saenz PTA GP 1                    Terri Saenz PTA  11/8/2019

## 2019-11-13 ENCOUNTER — HOSPITAL ENCOUNTER (OUTPATIENT)
Dept: PHYSICAL THERAPY | Facility: HOSPITAL | Age: 39
Setting detail: THERAPIES SERIES
Discharge: HOME | End: 2019-11-13

## 2019-11-13 DIAGNOSIS — M72.2 PLANTAR FASCIITIS OF LEFT FOOT: Primary | ICD-10-CM

## 2019-11-13 PROCEDURE — 97035 APP MDLTY 1+ULTRASOUND EA 15: CPT

## 2019-11-13 PROCEDURE — 97033 APP MDLTY 1+IONTPHRSIS EA 15: CPT

## 2019-11-13 PROCEDURE — 97110 THERAPEUTIC EXERCISES: CPT

## 2019-11-13 NOTE — THERAPY TREATMENT NOTE
Outpatient Physical Therapy Ortho Treatment Note  [facility]     Patient Name: Tamara Holstein  : 1980  MRN: [id number]  Today's Date: 2019      Visit Date: 2019    Visit Dx:    ICD-10-CM ICD-9-CM   1. 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     No past surgical history on file.                    PT Assessment/Plan     Row Name 19 1055          PT Assessment    Assessment Comments  Pt demonstrates decreased point tenderness along medial arch and continues to tolerate treatment well. Instructed to pt to continue to work out of the boot as tolerated  -KM        PT Plan    PT Plan Comments  Progress to WB exercises.   -KM       User Key  (r) = Recorded By, (t) = Taken By, (c) = Cosigned By    Initials Name Provider Type    Terri Paredes PTA Physical Therapy Assistant          Modalities     Row Name 19 1055             Ultrasound 61604    Location  Plantar Fascia  -KM      Duty Cycle  50  -KM      Frequency  3.0 MHz  -KM      Intensity - Wts/cm  1.5  -KM         Iontophoresis 09439    Milliamps  40  -KM      MA/Min  4  -KM      Dexamethasone used  Yes  -KM      Patch Type  Large  -KM        User Key  (r) = Recorded By, (t) = Taken By, (c) = Cosigned By    Initials Name Provider Type    Terri Paredes PTA Physical Therapy Assistant        OP Exercises     Row Name 19 1057             Subjective Comments    Subjective Comments  Pt states her f/u appointment went well, she is to start working out of the boot and continue with PT. Pt states she has started wearing a shoe a couple hours each day and has experienced minimal soreness.   -KM         Exercise 1    Exercise  Name 1  NWB Gastroc/Soleus Stretch  -KM      Reps 1  10  -KM      Time 1  10 sec hold each  -KM         Exercise 2    Exercise Name 2  Seated Great Toe Stretch  -KM      Reps 2  10  -KM      Time 2  10 sec hold each  -KM         Exercise 3    Exercise Name 3  Pro Stretch  -KM      Time 3  3 min  -KM         Exercise 4    Exercise Name 4  Ankle PF vs Band  -KM      Reps 4  40  -KM      Time 4  Gold  -KM         Exercise 5    Exercise Name 5  Ankle IV vs Band  -KM      Reps 5  40  -KM      Time 5  Black  -KM         Exercise 6    Exercise Name 6  Towel Curls  -KM      Time 6  5 min  -KM      Additional Comments  2#  -KM        User Key  (r) = Recorded By, (t) = Taken By, (c) = Cosigned By    Initials Name Provider Type    Terri Paredes PTA Physical Therapy Assistant                                          Time Calculation:   Start Time: 1055  Stop Time: 1142  Time Calculation (min): 47 min  Therapy Charges for Today     Code Description Service Date Service Provider Modifiers Qty    73304070701 HC PT THER PROC EA 15 MIN 11/13/2019 Terri Saenz PTA GP 1    03491072265 HC PT ULTRASOUND EA 15 MIN 11/13/2019 Terri Saenz PTA GP 1    31048515365 HC PT IONTOPHORESIS EA 15 MIN 11/13/2019 Terri Saenz PTA GP 1                    Terri Saenz PTA  11/13/2019

## 2019-11-15 ENCOUNTER — HOSPITAL ENCOUNTER (OUTPATIENT)
Dept: PHYSICAL THERAPY | Facility: HOSPITAL | Age: 39
Setting detail: THERAPIES SERIES
Discharge: HOME | End: 2019-11-15

## 2019-11-15 DIAGNOSIS — M72.2 PLANTAR FASCIITIS OF LEFT FOOT: Primary | ICD-10-CM

## 2019-11-15 PROCEDURE — 97110 THERAPEUTIC EXERCISES: CPT

## 2019-11-15 PROCEDURE — 97033 APP MDLTY 1+IONTPHRSIS EA 15: CPT

## 2019-11-15 PROCEDURE — 97035 APP MDLTY 1+ULTRASOUND EA 15: CPT

## 2019-11-15 NOTE — THERAPY TREATMENT NOTE
Outpatient Physical Therapy Ortho Treatment Note  [facility]     Patient Name: Tamara Holstein  : 1980  MRN: [id number]  Today's Date: 11/15/2019      Visit Date: 11/15/2019    Visit Dx:    ICD-10-CM ICD-9-CM   1. 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     No past surgical history on file.                    PT Assessment/Plan     Row Name 11/15/19 1055          PT Assessment    Assessment Comments  Pt tolerated the addition of WB exercises well. Improved balance noted with SLB during the 2nd and 3rd rep.   -KM        PT Plan    PT Plan Comments  Pt to complete HEP daily and continue to work out of the boot. Assess pts response to the WB exercises  -KM       User Key  (r) = Recorded By, (t) = Taken By, (c) = Cosigned By    Initials Name Provider Type    Terri Paredes PTA Physical Therapy Assistant          Modalities     Row Name 11/15/19 1055             Ultrasound 03935    Location  Plantar Fascia  -KM      Duty Cycle  50  -KM      Frequency  3.0 MHz  -KM      Intensity - Wts/cm  1.5  -KM         Iontophoresis 22035    Milliamps  40  -KM      MA/Min  4  -KM      Dexamethasone used  Yes  -KM      Patch Type  Large  -KM        User Key  (r) = Recorded By, (t) = Taken By, (c) = Cosigned By    Initials Name Provider Type    Terri Paredes PTA Physical Therapy Assistant        OP Exercises     Row Name 11/15/19 1054             Subjective Comments    Subjective Comments  Pt states her symptoms continue to improve with WB activities including  weightlifting at the gym. States she will wear her boot a couple of hours in the afternoon to prevent symptoms from increasing   -KM         Exercise 1     Exercise Name 1  NWB Gastroc/Soleus Stretch  -KM      Reps 1  10  -KM      Time 1  10 sec hold each  -KM         Exercise 2    Exercise Name 2  Seated Great Toe Stretch  -KM      Reps 2  10  -KM      Time 2  10 sec hold each  -KM         Exercise 3    Exercise Name 3  Pro Stretch  -KM      Time 3  3 min  -KM         Exercise 4    Exercise Name 4  Ankle PF vs Band  -KM      Reps 4  40  -KM      Time 4  Gold  -KM         Exercise 5    Exercise Name 5  Ankle IV vs Band  -KM      Reps 5  40  -KM      Time 5  Black  -KM         Exercise 6    Exercise Name 6  Towel Curls  -KM      Time 6  5 min  -KM      Additional Comments  2#  -KM         Exercise 7    Exercise Name 7  Heel Raises  -KM      Reps 7  25  -KM         Exercise 8    Exercise Name 8  SIngle Leg Balance  -KM      Reps 8  3  -KM      Time 8  30 seconds.   -KM        User Key  (r) = Recorded By, (t) = Taken By, (c) = Cosigned By    Initials Name Provider Type    Terri Paredes PTA Physical Therapy Assistant                                          Time Calculation:   Start Time: 1055  Stop Time: 1150  Time Calculation (min): 55 min  Therapy Charges for Today     Code Description Service Date Service Provider Modifiers Qty    99893874548 HC PT THER PROC EA 15 MIN 11/15/2019 Terri Saenz PTA GP 1    65838652693 HC PT ULTRASOUND EA 15 MIN 11/15/2019 Terri Saenz PTA GP 1    99242425411 HC PT IONTOPHORESIS EA 15 MIN 11/15/2019 Terri Saenz PTA GP 1                    Terri Saenz PTA  11/15/2019

## 2019-11-20 ENCOUNTER — HOSPITAL ENCOUNTER (OUTPATIENT)
Dept: PHYSICAL THERAPY | Facility: HOSPITAL | Age: 39
Setting detail: THERAPIES SERIES
Discharge: HOME | End: 2019-11-20

## 2019-11-20 DIAGNOSIS — M72.2 PLANTAR FASCIITIS OF LEFT FOOT: Primary | ICD-10-CM

## 2019-11-20 PROCEDURE — 97110 THERAPEUTIC EXERCISES: CPT

## 2019-11-20 PROCEDURE — 97035 APP MDLTY 1+ULTRASOUND EA 15: CPT

## 2019-11-20 PROCEDURE — 97033 APP MDLTY 1+IONTPHRSIS EA 15: CPT

## 2019-11-20 NOTE — THERAPY TREATMENT NOTE
Outpatient Physical Therapy Ortho Treatment Note  [facility]     Patient Name: Tamara Holstein  : 1980  MRN: [id number]  Today's Date: 2019      Visit Date: 2019    Visit Dx:    ICD-10-CM ICD-9-CM   1. 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     No past surgical history on file.                    PT Assessment/Plan     Row Name 19 1200          PT Assessment    Assessment Comments  Pt tolerated the progression of WB/balance ther ex well with BAF added to SLB. Pt continues to respond well with modalities completed in regards to decreased symptoms.   -KM        PT Plan    PT Plan Comments  Continue to progress as tolerated. Pt to complete HEP and ice massage with frozen water bottle each night.   -KM       User Key  (r) = Recorded By, (t) = Taken By, (c) = Cosigned By    Initials Name Provider Type    Terri Paredes, PTA Physical Therapy Assistant            OP Exercises     Row Name 19 1103             Subjective Comments    Subjective Comments  Pt states she continues to work out of the boot and complete additional activities. States she will experience random swelling in her foot with certain shoes and prolonged standing.   -KM         Exercise 1    Exercise Name 1  NWB Gastroc/Soleus Stretch  -KM      Reps 1  10  -KM      Time 1  10 sec hold each  -KM         Exercise 2    Exercise Name 2  Seated Great Toe Stretch  -KM      Reps 2  10  -KM      Time 2  10 sec hold each  -KM         Exercise 3    Exercise Name 3  Pro Stretch  -KM      Time 3  3 min  -KM         Exercise 4    Exercise Name 4  Ankle PF vs Band  -KM      Reps 4  40  -KM      Time 4  Gold  -KM         Exercise 5     Exercise Name 5  Ankle IV vs Band  -KM      Reps 5  40  -KM      Time 5  Black  -KM         Exercise 6    Exercise Name 6  Towel Curls  -KM      Time 6  5 min  -KM         Exercise 7    Exercise Name 7  Heel Raises  -KM      Reps 7  25  -KM         Exercise 8    Exercise Name 8  SIngle Leg Balance  -KM      Reps 8  3  -KM      Time 8  30 seconds.   -KM        User Key  (r) = Recorded By, (t) = Taken By, (c) = Cosigned By    Initials Name Provider Type    Terri Paredes PTA Physical Therapy Assistant                                          Time Calculation:   Start Time: 1103  Stop Time: 1155  Time Calculation (min): 52 min  Therapy Charges for Today     Code Description Service Date Service Provider Modifiers Qty    34059877951 HC PT THER PROC EA 15 MIN 11/20/2019 Terri Saenz PTA GP 1    28662990780 HC PT ULTRASOUND EA 15 MIN 11/20/2019 Terri Saenz PTA GP 1    11400227340 HC PT IONTOPHORESIS EA 15 MIN 11/20/2019 Terri Saenz PTA GP 1                    Terri Saenz PTA  11/20/2019

## 2019-11-22 ENCOUNTER — HOSPITAL ENCOUNTER (OUTPATIENT)
Dept: PHYSICAL THERAPY | Facility: HOSPITAL | Age: 39
Setting detail: THERAPIES SERIES
Discharge: HOME | End: 2019-11-22

## 2019-11-22 DIAGNOSIS — M72.2 PLANTAR FASCIITIS OF LEFT FOOT: Primary | ICD-10-CM

## 2019-11-22 PROCEDURE — 97033 APP MDLTY 1+IONTPHRSIS EA 15: CPT

## 2019-11-22 PROCEDURE — 97110 THERAPEUTIC EXERCISES: CPT

## 2019-11-22 PROCEDURE — 97035 APP MDLTY 1+ULTRASOUND EA 15: CPT

## 2019-11-22 NOTE — THERAPY TREATMENT NOTE
Outpatient Physical Therapy Ortho Treatment Note  NILE Mcmahan     Patient Name: Tamara Holstein  : 1980  MRN: [id number]  Today's Date: 2019      Visit Date: 2019    Visit Dx:    ICD-10-CM ICD-9-CM   1. 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     No past surgical history on file.                    PT Assessment/Plan     Row Name 19 1055          PT Assessment    Assessment Comments  Pt progressed well with single leg activities including heel raises and SLB on BAF. Plan to see pt 1x next week.  -KM        PT Plan    PT Plan Comments  Plan to see pt 1x next week, assess pts level of symptoms.   -KM       User Key  (r) = Recorded By, (t) = Taken By, (c) = Cosigned By    Initials Name Provider Type    Terri Paredes PTA Physical Therapy Assistant          Modalities     Row Name 19 1055             Ultrasound [id number]    Location  Plantar Fascia  -KM      Duty Cycle  50  -KM      Frequency  3.0 MHz  -KM      Intensity - Wts/cm  1.5  -KM         Iontophoresis 58146    Milliamps  40  -KM      MA/Min  4  -KM      Dexamethasone used  Yes  -KM      Patch Type  Large  -KM        User Key  (r) = Recorded By, (t) = Taken By, (c) = Cosigned By    Initials Name Provider Type    Terri Paredes PTA Physical Therapy Assistant        OP Exercises     Row Name 19 1055             Subjective Comments    Subjective Comments  Pt states she has noticed slow improvement with symptoms, she is able wear her shoe all day and has had decreased swelling.   -KM         Exercise 1    Exercise Name 1  NWB Gastroc/Soleus Stretch  -KM      Reps 1  10  -KM      Time 1  10 sec hold each  -KM          Exercise 2    Exercise Name 2  Seated Great Toe Stretch  -KM      Reps 2  10  -KM      Time 2  10 sec hold each  -KM         Exercise 3    Exercise Name 3  Pro Stretch  -KM      Time 3  3 min  -KM         Exercise 4    Exercise Name 4  Ankle PF vs Band  -KM      Reps 4  40  -KM      Time 4  Gold  -KM         Exercise 5    Exercise Name 5  Ankle IV vs Band  -KM      Reps 5  25  -KM      Time 5  Silver  -KM         Exercise 6    Exercise Name 6  Towel Curls  -KM      Time 6  5 min  -KM      Additional Comments  2#  -KM         Exercise 7    Exercise Name 7  Heel Raises  -KM      Reps 7  25  -KM      Additional Comments  Single Leg  -KM         Exercise 8    Exercise Name 8  SIngle Leg Balance  -KM      Reps 8  3  -KM      Time 8  30 seconds.   -KM      Additional Comments  BAF  -KM        User Key  (r) = Recorded By, (t) = Taken By, (c) = Cosigned By    Initials Name Provider Type    Terri Paredes PTA Physical Therapy Assistant                                          Time Calculation:   Start Time: 1055  Stop Time: 1153  Time Calculation (min): 58 min  Therapy Charges for Today     Code Description Service Date Service Provider Modifiers Qty    23796836949 HC PT THER PROC EA 15 MIN 11/22/2019 Terri Saenz PTA GP 1    02876880275 HC PT ULTRASOUND EA 15 MIN 11/22/2019 Terri Saenz PTA GP 1    15989019131 HC PT IONTOPHORESIS EA 15 MIN 11/22/2019 Terri Saenz PTA GP 1                    Terri Saenz PTA  11/22/2019

## 2020-12-18 ENCOUNTER — OFFICE VISIT (OUTPATIENT)
Dept: OBSTETRICS AND GYNECOLOGY | Facility: CLINIC | Age: 40
End: 2020-12-18

## 2020-12-18 VITALS
HEIGHT: 66 IN | WEIGHT: 260 LBS | SYSTOLIC BLOOD PRESSURE: 140 MMHG | DIASTOLIC BLOOD PRESSURE: 78 MMHG | BODY MASS INDEX: 41.78 KG/M2

## 2020-12-18 DIAGNOSIS — Z01.419 WELL WOMAN EXAM: ICD-10-CM

## 2020-12-18 DIAGNOSIS — E66.01 CLASS 2 SEVERE OBESITY DUE TO EXCESS CALORIES WITH SERIOUS COMORBIDITY IN ADULT, UNSPECIFIED BMI (HCC): ICD-10-CM

## 2020-12-18 DIAGNOSIS — Z01.419 PAP SMEAR, LOW-RISK: ICD-10-CM

## 2020-12-18 DIAGNOSIS — Z13.9 SCREENING FOR CONDITION: Primary | ICD-10-CM

## 2020-12-18 DIAGNOSIS — N92.1 MENOMETRORRHAGIA: ICD-10-CM

## 2020-12-18 DIAGNOSIS — E66.01 MORBID OBESITY WITH BMI OF 40.0-44.9, ADULT (HCC): ICD-10-CM

## 2020-12-18 DIAGNOSIS — E28.2 PCOS (POLYCYSTIC OVARIAN SYNDROME): ICD-10-CM

## 2020-12-18 DIAGNOSIS — Z11.51 ENCOUNTER FOR SCREENING FOR HUMAN PAPILLOMAVIRUS (HPV): ICD-10-CM

## 2020-12-18 PROBLEM — Z30.011 ENCOUNTER FOR INITIAL PRESCRIPTION OF CONTRACEPTIVE PILLS: Status: RESOLVED | Noted: 2019-04-16 | Resolved: 2020-12-18

## 2020-12-18 LAB
BILIRUB BLD-MCNC: NEGATIVE MG/DL
CLARITY, POC: CLEAR
COLOR UR: YELLOW
GLUCOSE UR STRIP-MCNC: NEGATIVE MG/DL
KETONES UR QL: NEGATIVE
LEUKOCYTE EST, POC: NEGATIVE
NITRITE UR-MCNC: NEGATIVE MG/ML
PH UR: 5 [PH] (ref 5–8)
PROT UR STRIP-MCNC: NEGATIVE MG/DL
RBC # UR STRIP: NEGATIVE /UL
SP GR UR: 1 (ref 1–1.03)
UROBILINOGEN UR QL: NORMAL

## 2020-12-18 PROCEDURE — 99396 PREV VISIT EST AGE 40-64: CPT | Performed by: OBSTETRICS & GYNECOLOGY

## 2020-12-18 PROCEDURE — 81002 URINALYSIS NONAUTO W/O SCOPE: CPT | Performed by: OBSTETRICS & GYNECOLOGY

## 2020-12-18 PROCEDURE — 99213 OFFICE O/P EST LOW 20 MIN: CPT | Performed by: OBSTETRICS & GYNECOLOGY

## 2020-12-18 RX ORDER — SODIUM CHLORIDE 0.9 % (FLUSH) 0.9 %
3-10 SYRINGE (ML) INJECTION AS NEEDED
Status: CANCELLED | OUTPATIENT
Start: 2020-12-18

## 2020-12-18 RX ORDER — SODIUM CHLORIDE 0.9 % (FLUSH) 0.9 %
3 SYRINGE (ML) INJECTION EVERY 12 HOURS SCHEDULED
Status: CANCELLED | OUTPATIENT
Start: 2020-12-18

## 2020-12-18 RX ORDER — MINOCYCLINE HYDROCHLORIDE 50 MG/1
50 TABLET ORAL EVERY MORNING
COMMUNITY
Start: 2020-10-02

## 2020-12-18 RX ORDER — CETIRIZINE HYDROCHLORIDE 10 MG/1
10 CAPSULE, LIQUID FILLED ORAL EVERY MORNING
COMMUNITY
Start: 2014-02-25

## 2020-12-18 RX ORDER — AMOXICILLIN 500 MG/1
CAPSULE ORAL
COMMUNITY
Start: 2020-10-31 | End: 2020-12-18

## 2020-12-18 NOTE — PROGRESS NOTES
GYN Annual Exam     CC- Here for annual exam.     Pt new to practice? No  Pt new to me? No     Tamara Holstein is a 40 y.o.  female who presents for annual well woman exam. Patient's last menstrual period was 12/15/2020 (exact date).    Problems in addition to need for annual: abnormal bleeding.     HPI: Menorrhagia  This is a recurrent problem. The current episode started more than 1 year ago. The problem occurs intermittently. The problem has been waxing and waning. Pertinent negatives include no abdominal pain, fever, urinary symptoms or vomiting. Nothing aggravates the symptoms. She has tried nothing for the symptoms.       PMHX:  Patient Active Problem List   Diagnosis   • Obesity due to excess calories   • Other acne   • PCOS (polycystic ovarian syndrome)   • Pelvic pain   • ASCUS with positive high risk HPV cervical   • Morbid obesity with BMI of 40.0-44.9, adult (CMS/[facility])   • Menometrorrhagia   ; otherwise none    OB History        0    Para   0    Term   0       0    AB   0    Living   0       SAB   0    TAB   0    Ectopic   0    Molar   0    Multiple   0    Live Births   0                  History reviewed. No pertinent past medical history.    History reviewed. No pertinent surgical history.      Current Outpatient Medications:   •  Cetirizine HCl (ZyrTEC Allergy) 10 MG capsule, ZYRTEC ALLERGY 10 MG CAPS, Disp: , Rfl:   •  cetirizine (zyrTEC) 10 MG tablet, Take 10 mg by mouth Daily., Disp: , Rfl:   •  minocycline (DYNACIN) 50 MG tablet, Take  by mouth Daily., Disp: , Rfl:     Allergies   Allergen Reactions   • Sulfa Antibiotics Hives       Social History     Tobacco Use   • Smoking status: Not on file   Substance Use Topics   • Alcohol use: Not on file   • Drug use: Not on file       Smoker: No    History reviewed. No pertinent family history.    Review of Systems   Constitutional: Negative.  Negative for fever.   HENT: Negative.    Eyes: Negative.    Respiratory: Negative.   "  Cardiovascular: Negative.    Gastrointestinal: Negative.  Negative for abdominal pain and vomiting.   Endocrine: Negative.    Genitourinary: Positive for menorrhagia and menstrual problem.   Musculoskeletal: Negative.    Skin: Negative.    Allergic/Immunologic: Negative.    Neurological: Negative.    Hematological: Negative.    Psychiatric/Behavioral: Negative.            EXAM:  /78   Ht 167.6 cm (66\")   Wt 118 kg (260 lb)   LMP 12/15/2020 (Exact Date)   Breastfeeding No   BMI 41.97 kg/m²     Physical Exam  Vitals signs and nursing note reviewed. Exam conducted with a chaperone present.   Constitutional:       General: She is not in acute distress.     Appearance: She is well-developed. She is not diaphoretic.   HENT:      Head: Normocephalic and atraumatic.      Nose: Nose normal.   Eyes:      Extraocular Movements: Extraocular movements intact.   Neck:      Musculoskeletal: Normal range of motion.   Cardiovascular:      Rate and Rhythm: Normal rate.   Pulmonary:      Effort: Pulmonary effort is normal.   Chest:      Breasts: Breasts are symmetrical.         Right: Normal. No mass, nipple discharge, skin change or tenderness.         Left: Normal. No mass, nipple discharge, skin change or tenderness.   Abdominal:      General: There is no distension.      Palpations: Abdomen is soft. There is no mass.      Tenderness: There is no abdominal tenderness. There is no guarding.   Genitourinary:     General: Normal vulva.      Pubic Area: No rash.       Vagina: Normal. No vaginal discharge.      Cervix: Normal.      Uterus: Normal.       Adnexa: Right adnexa normal and left adnexa normal.      Comments: Pap done  Musculoskeletal: Normal range of motion.         General: No tenderness or deformity.   Lymphadenopathy:      Upper Body:      Right upper body: No axillary adenopathy.      Left upper body: No axillary adenopathy.   Skin:     General: Skin is warm and dry.      Coloration: Skin is not pale.      " Findings: No erythema or rash.   Neurological:      Mental Status: She is alert and oriented to person, place, and time.   Psychiatric:         Behavior: Behavior normal.         Thought Content: Thought content normal.         Judgment: Judgment normal.            As part of wellness and prevention, the following topics were discussed with the patient:  []  Nutrition  []  Physical activity/regular exercise   [x]  Healthy weight  []  Injury prevention  [x]  Substance misuse/abuse  []  Sexual behavior  []  STD prevention  []  Contaception  []  Dental health  [x]  Mental health  []  Immunization  [x]  Encouraged SBE     Counseling and guidance done:  Nutrition, physical activity, healthy weight, injury prevention, misuse of tobacco, alcohol and drugs, sexual behavior and STDs, contraception, dental health, mental health, immunizations breast cancer screening and exams.    Assessment     1) GYN annual well woman exam.   2) PAP done today? Yes  3) problems addressed: menometrorrhagia    MAMMOGRAM UP TO DATE IF AGE APPROPRIATE?  No      Fhx breast cancer? No    Fhx ovarian cancer? No    Fhx colon cancer? No    Invitae testing offered? Yes           Plan       Follow up prn or one year.    Diagnoses and all orders for this visit:    1. Screening for condition (Primary)  -     POC Urinalysis Dipstick  -     Mammo Screening Digital Tomosynthesis Bilateral With CAD; Future    2. Class 2 severe obesity due to excess calories with serious comorbidity in adult, unspecified BMI (CMS/HCC)    3. PCOS (polycystic ovarian syndrome)  -     Case Request; Standing  -     CBC and Differential; Future  -     sodium chloride 0.9 % flush 3 mL  -     sodium chloride 0.9 % flush 3-10 mL  -     Case Request    4. Pap smear, low-risk  -     PapIG, CtNgTv, HPV, Rfx 16 / 18    5. Encounter for screening for human papillomavirus (HPV)  -     PapIG, CtNgTv, HPV, Rfx 16 / 18    6. Well woman exam    7. Morbid obesity with BMI of 40.0-44.9, adult  (CMS/[facility])    8. Menometrorrhagia  -     Case Request; Standing  -     CBC and Differential; Future  -     sodium chloride 0.9 % flush 3 mL  -     sodium chloride 0.9 % flush 3-10 mL  -     Case Request    Other orders  -     Follow Anesthesia Guidelines / Standing Orders; Future  -     Chlorhexidine Skin Prep; Future  -     Follow Anesthesia Guidelines / Standing Orders; Standing  -     Obtain informed consent; Standing  -     Verify / Perform Chlorhexidine Skin Prep; Standing  -     Insert Peripheral IV; Standing  -     Saline Lock & Maintain IV Access; Standing        RTO Return in about 1 year (around 12/18/2021) for Annual physical.    TIME: More than 50% of time spent in counseling and/or coordination of care.Time spent in counseling 20 min.  Counseling included the following topics 20 with prognosis, differential diagnosis, risks, benefits of treatment, instructions, compliance and/or risk reduction and alternatives.       Yogi Patten MD  [unfilled]  22:11 EST

## 2020-12-23 ENCOUNTER — RESULTS ENCOUNTER (OUTPATIENT)
Dept: OBSTETRICS AND GYNECOLOGY | Facility: CLINIC | Age: 40
End: 2020-12-23

## 2020-12-23 DIAGNOSIS — N92.1 MENOMETRORRHAGIA: ICD-10-CM

## 2020-12-23 DIAGNOSIS — E28.2 PCOS (POLYCYSTIC OVARIAN SYNDROME): ICD-10-CM

## 2020-12-23 LAB
C TRACH RRNA CVX QL NAA+PROBE: NEGATIVE
CYTOLOGIST CVX/VAG CYTO: NORMAL
CYTOLOGY CVX/VAG DOC CYTO: NORMAL
CYTOLOGY CVX/VAG DOC THIN PREP: NORMAL
DX ICD CODE: NORMAL
HIV 1 & 2 AB SER-IMP: NORMAL
HPV I/H RISK 1 DNA CVX QL PROBE+SIG AMP: NORMAL
HPV I/H RISK 4 DNA CVX QL PROBE+SIG AMP: NEGATIVE
N GONORRHOEA RRNA CVX QL NAA+PROBE: NEGATIVE
OTHER STN SPEC: NORMAL
STAT OF ADQ CVX/VAG CYTO-IMP: NORMAL
T VAGINALIS RRNA SPEC QL NAA+PROBE: NEGATIVE

## 2020-12-28 ENCOUNTER — APPOINTMENT (OUTPATIENT)
Dept: PREADMISSION TESTING | Facility: HOSPITAL | Age: 40
End: 2020-12-28

## 2020-12-28 VITALS — WEIGHT: 256.6 LBS | BODY MASS INDEX: 38.89 KG/M2 | HEIGHT: 68 IN

## 2020-12-28 LAB
BASOPHILS # BLD AUTO: 0.02 10*3/MM3 (ref 0–0.2)
BASOPHILS NFR BLD AUTO: 0.3 % (ref 0–1.5)
DEPRECATED RDW RBC AUTO: 40.5 FL (ref 37–54)
EOSINOPHIL # BLD AUTO: 0.1 10*3/MM3 (ref 0–0.4)
EOSINOPHIL NFR BLD AUTO: 1.5 % (ref 0.3–6.2)
ERYTHROCYTE [DISTWIDTH] IN BLOOD BY AUTOMATED COUNT: 12 % (ref 12.3–15.4)
HBA1C MFR BLD: 5.1 % (ref 4.8–5.6)
HCT VFR BLD AUTO: 39.9 % (ref 34–46.6)
HGB BLD-MCNC: 12.9 G/DL (ref 12–15.9)
IMM GRANULOCYTES # BLD AUTO: 0.02 10*3/MM3 (ref 0–0.05)
IMM GRANULOCYTES NFR BLD AUTO: 0.3 % (ref 0–0.5)
LYMPHOCYTES # BLD AUTO: 2.78 10*3/MM3 (ref 0.7–3.1)
LYMPHOCYTES NFR BLD AUTO: 40.9 % (ref 19.6–45.3)
MCH RBC QN AUTO: 29.3 PG (ref 26.6–33)
MCHC RBC AUTO-ENTMCNC: 32.3 G/DL (ref 31.5–35.7)
MCV RBC AUTO: 90.7 FL (ref 79–97)
MONOCYTES # BLD AUTO: 0.6 10*3/MM3 (ref 0.1–0.9)
MONOCYTES NFR BLD AUTO: 8.8 % (ref 5–12)
NEUTROPHILS NFR BLD AUTO: 3.28 10*3/MM3 (ref 1.7–7)
NEUTROPHILS NFR BLD AUTO: 48.2 % (ref 42.7–76)
PLATELET # BLD AUTO: 290 10*3/MM3 (ref 140–450)
PMV BLD AUTO: 9.4 FL (ref 6–12)
QT INTERVAL: 424 MS
RBC # BLD AUTO: 4.4 10*6/MM3 (ref 3.77–5.28)
WBC # BLD AUTO: 6.8 10*3/MM3 (ref 3.4–10.8)

## 2020-12-28 PROCEDURE — 93010 ELECTROCARDIOGRAM REPORT: CPT | Performed by: INTERNAL MEDICINE

## 2020-12-28 PROCEDURE — 85025 COMPLETE CBC W/AUTO DIFF WBC: CPT | Performed by: OBSTETRICS & GYNECOLOGY

## 2020-12-28 PROCEDURE — C9803 HOPD COVID-19 SPEC COLLECT: HCPCS

## 2020-12-28 PROCEDURE — U0004 COV-19 TEST NON-CDC HGH THRU: HCPCS | Performed by: OBSTETRICS & GYNECOLOGY

## 2020-12-28 PROCEDURE — 36415 COLL VENOUS BLD VENIPUNCTURE: CPT | Performed by: OBSTETRICS & GYNECOLOGY

## 2020-12-28 PROCEDURE — 83036 HEMOGLOBIN GLYCOSYLATED A1C: CPT | Performed by: OBSTETRICS & GYNECOLOGY

## 2020-12-28 PROCEDURE — 93005 ELECTROCARDIOGRAM TRACING: CPT

## 2020-12-28 RX ORDER — MULTIPLE VITAMINS W/ MINERALS TAB 9MG-400MCG
1 TAB ORAL DAILY
COMMUNITY

## 2020-12-29 PROBLEM — E66.09 OBESITY DUE TO EXCESS CALORIES: Status: RESOLVED | Noted: 2019-04-16 | Resolved: 2020-12-29

## 2020-12-29 LAB — SARS-COV-2 ORF1AB RESP QL NAA+PROBE: NOT DETECTED

## 2020-12-29 PROCEDURE — S0260 H&P FOR SURGERY: HCPCS | Performed by: OBSTETRICS & GYNECOLOGY

## 2020-12-30 ENCOUNTER — ANESTHESIA EVENT (OUTPATIENT)
Dept: PERIOP | Facility: HOSPITAL | Age: 40
End: 2020-12-30

## 2020-12-31 ENCOUNTER — HOSPITAL ENCOUNTER (OUTPATIENT)
Facility: HOSPITAL | Age: 40
Setting detail: HOSPITAL OUTPATIENT SURGERY
Discharge: HOME | End: 2020-12-31
Attending: OBSTETRICS & GYNECOLOGY | Admitting: OBSTETRICS & GYNECOLOGY

## 2020-12-31 ENCOUNTER — ANESTHESIA (OUTPATIENT)
Dept: PERIOP | Facility: HOSPITAL | Age: 40
End: 2020-12-31

## 2020-12-31 VITALS
RESPIRATION RATE: 15 BRPM | OXYGEN SATURATION: 98 % | TEMPERATURE: 98 F | BODY MASS INDEX: 39.62 KG/M2 | HEART RATE: 85 BPM | WEIGHT: 260.6 LBS | DIASTOLIC BLOOD PRESSURE: 79 MMHG | SYSTOLIC BLOOD PRESSURE: 142 MMHG

## 2020-12-31 DIAGNOSIS — E28.2 PCOS (POLYCYSTIC OVARIAN SYNDROME): ICD-10-CM

## 2020-12-31 DIAGNOSIS — N92.1 MENOMETRORRHAGIA: ICD-10-CM

## 2020-12-31 LAB — HCG SERPL QL: NEGATIVE

## 2020-12-31 PROCEDURE — 58558 HYSTEROSCOPY BIOPSY: CPT | Performed by: OBSTETRICS & GYNECOLOGY

## 2020-12-31 PROCEDURE — 25010000002 MIDAZOLAM PER 1MG: Performed by: NURSE ANESTHETIST, CERTIFIED REGISTERED

## 2020-12-31 PROCEDURE — 25010000002 PROPOFOL 10 MG/ML EMULSION: Performed by: NURSE ANESTHETIST, CERTIFIED REGISTERED

## 2020-12-31 PROCEDURE — 88305 TISSUE EXAM BY PATHOLOGIST: CPT | Performed by: OBSTETRICS & GYNECOLOGY

## 2020-12-31 PROCEDURE — 25010000002 ONDANSETRON PER 1 MG: Performed by: NURSE ANESTHETIST, CERTIFIED REGISTERED

## 2020-12-31 PROCEDURE — 84703 CHORIONIC GONADOTROPIN ASSAY: CPT | Performed by: NURSE ANESTHETIST, CERTIFIED REGISTERED

## 2020-12-31 PROCEDURE — 25010000002 DEXAMETHASONE PER 1 MG: Performed by: NURSE ANESTHETIST, CERTIFIED REGISTERED

## 2020-12-31 PROCEDURE — 25010000002 FENTANYL CITRATE (PF) 100 MCG/2ML SOLUTION: Performed by: NURSE ANESTHETIST, CERTIFIED REGISTERED

## 2020-12-31 RX ORDER — SODIUM CHLORIDE 9 MG/ML
INJECTION, SOLUTION INTRAVENOUS AS NEEDED
Status: DISCONTINUED | OUTPATIENT
Start: 2020-12-31 | End: 2020-12-31 | Stop reason: HOSPADM

## 2020-12-31 RX ORDER — FENTANYL CITRATE 50 UG/ML
50 INJECTION, SOLUTION INTRAMUSCULAR; INTRAVENOUS
Status: CANCELLED | OUTPATIENT
Start: 2020-12-31

## 2020-12-31 RX ORDER — HYDROMORPHONE HYDROCHLORIDE 1 MG/ML
0.25 INJECTION, SOLUTION INTRAMUSCULAR; INTRAVENOUS; SUBCUTANEOUS
Status: CANCELLED | OUTPATIENT
Start: 2020-12-31

## 2020-12-31 RX ORDER — ONDANSETRON 2 MG/ML
4 INJECTION INTRAMUSCULAR; INTRAVENOUS ONCE
Status: COMPLETED | OUTPATIENT
Start: 2020-12-31 | End: 2020-12-31

## 2020-12-31 RX ORDER — FAMOTIDINE 10 MG/ML
20 INJECTION, SOLUTION INTRAVENOUS
Status: DISCONTINUED | OUTPATIENT
Start: 2020-12-31 | End: 2020-12-31 | Stop reason: HOSPADM

## 2020-12-31 RX ORDER — PROPOFOL 10 MG/ML
VIAL (ML) INTRAVENOUS AS NEEDED
Status: DISCONTINUED | OUTPATIENT
Start: 2020-12-31 | End: 2020-12-31 | Stop reason: SURG

## 2020-12-31 RX ORDER — SODIUM CHLORIDE, SODIUM LACTATE, POTASSIUM CHLORIDE, CALCIUM CHLORIDE 600; 310; 30; 20 MG/100ML; MG/100ML; MG/100ML; MG/100ML
9 INJECTION, SOLUTION INTRAVENOUS CONTINUOUS
Status: DISCONTINUED | OUTPATIENT
Start: 2020-12-31 | End: 2020-12-31 | Stop reason: HOSPADM

## 2020-12-31 RX ORDER — LIDOCAINE HYDROCHLORIDE 10 MG/ML
0.5 INJECTION, SOLUTION EPIDURAL; INFILTRATION; INTRACAUDAL; PERINEURAL ONCE AS NEEDED
Status: DISCONTINUED | OUTPATIENT
Start: 2020-12-31 | End: 2020-12-31 | Stop reason: HOSPADM

## 2020-12-31 RX ORDER — DEXAMETHASONE SODIUM PHOSPHATE 4 MG/ML
4 INJECTION, SOLUTION INTRA-ARTICULAR; INTRALESIONAL; INTRAMUSCULAR; INTRAVENOUS; SOFT TISSUE ONCE AS NEEDED
Status: COMPLETED | OUTPATIENT
Start: 2020-12-31 | End: 2020-12-31

## 2020-12-31 RX ORDER — SODIUM CHLORIDE 0.9 % (FLUSH) 0.9 %
3 SYRINGE (ML) INJECTION EVERY 12 HOURS SCHEDULED
Status: DISCONTINUED | OUTPATIENT
Start: 2020-12-31 | End: 2020-12-31 | Stop reason: HOSPADM

## 2020-12-31 RX ORDER — ACETAMINOPHEN 500 MG
1000 TABLET ORAL ONCE
Status: COMPLETED | OUTPATIENT
Start: 2020-12-31 | End: 2020-12-31

## 2020-12-31 RX ORDER — LIDOCAINE HYDROCHLORIDE 20 MG/ML
INJECTION, SOLUTION INFILTRATION; PERINEURAL AS NEEDED
Status: DISCONTINUED | OUTPATIENT
Start: 2020-12-31 | End: 2020-12-31 | Stop reason: SURG

## 2020-12-31 RX ORDER — SODIUM CHLORIDE 0.9 % (FLUSH) 0.9 %
10 SYRINGE (ML) INJECTION EVERY 12 HOURS SCHEDULED
Status: DISCONTINUED | OUTPATIENT
Start: 2020-12-31 | End: 2020-12-31 | Stop reason: HOSPADM

## 2020-12-31 RX ORDER — ONDANSETRON 2 MG/ML
4 INJECTION INTRAMUSCULAR; INTRAVENOUS ONCE AS NEEDED
Status: CANCELLED | OUTPATIENT
Start: 2020-12-31

## 2020-12-31 RX ORDER — SODIUM CHLORIDE 0.9 % (FLUSH) 0.9 %
3-10 SYRINGE (ML) INJECTION AS NEEDED
Status: DISCONTINUED | OUTPATIENT
Start: 2020-12-31 | End: 2020-12-31 | Stop reason: HOSPADM

## 2020-12-31 RX ORDER — SODIUM CHLORIDE 9 MG/ML
40 INJECTION, SOLUTION INTRAVENOUS AS NEEDED
Status: DISCONTINUED | OUTPATIENT
Start: 2020-12-31 | End: 2020-12-31 | Stop reason: HOSPADM

## 2020-12-31 RX ORDER — FENTANYL CITRATE 50 UG/ML
INJECTION, SOLUTION INTRAMUSCULAR; INTRAVENOUS AS NEEDED
Status: DISCONTINUED | OUTPATIENT
Start: 2020-12-31 | End: 2020-12-31 | Stop reason: SURG

## 2020-12-31 RX ORDER — MIDAZOLAM HYDROCHLORIDE 2 MG/2ML
1 INJECTION, SOLUTION INTRAMUSCULAR; INTRAVENOUS
Status: DISCONTINUED | OUTPATIENT
Start: 2020-12-31 | End: 2020-12-31 | Stop reason: HOSPADM

## 2020-12-31 RX ORDER — SODIUM CHLORIDE 0.9 % (FLUSH) 0.9 %
10 SYRINGE (ML) INJECTION AS NEEDED
Status: DISCONTINUED | OUTPATIENT
Start: 2020-12-31 | End: 2020-12-31 | Stop reason: HOSPADM

## 2020-12-31 RX ORDER — OXYCODONE HYDROCHLORIDE AND ACETAMINOPHEN 5; 325 MG/1; MG/1
1 TABLET ORAL ONCE AS NEEDED
Status: CANCELLED | OUTPATIENT
Start: 2020-12-31

## 2020-12-31 RX ORDER — HYDROMORPHONE HYDROCHLORIDE 1 MG/ML
0.5 INJECTION, SOLUTION INTRAMUSCULAR; INTRAVENOUS; SUBCUTANEOUS
Status: CANCELLED | OUTPATIENT
Start: 2020-12-31

## 2020-12-31 RX ADMIN — LIDOCAINE HYDROCHLORIDE 100 MG: 20 INJECTION, SOLUTION INFILTRATION; PERINEURAL at 12:56

## 2020-12-31 RX ADMIN — PROPOFOL 50 MG: 10 INJECTION, EMULSION INTRAVENOUS at 12:56

## 2020-12-31 RX ADMIN — FENTANYL CITRATE 50 MCG: 50 INJECTION, SOLUTION INTRAMUSCULAR; INTRAVENOUS at 12:59

## 2020-12-31 RX ADMIN — FENTANYL CITRATE 50 MCG: 50 INJECTION, SOLUTION INTRAMUSCULAR; INTRAVENOUS at 12:56

## 2020-12-31 RX ADMIN — PROPOFOL 50 MG: 10 INJECTION, EMULSION INTRAVENOUS at 12:58

## 2020-12-31 RX ADMIN — DEXAMETHASONE SODIUM PHOSPHATE 4 MG: 4 INJECTION, SOLUTION INTRAMUSCULAR; INTRAVENOUS at 12:47

## 2020-12-31 RX ADMIN — ONDANSETRON 4 MG: 2 INJECTION, SOLUTION INTRAMUSCULAR; INTRAVENOUS at 12:47

## 2020-12-31 RX ADMIN — PROPOFOL 50 MG: 10 INJECTION, EMULSION INTRAVENOUS at 13:01

## 2020-12-31 RX ADMIN — ACETAMINOPHEN 1000 MG: 500 TABLET ORAL at 12:46

## 2020-12-31 RX ADMIN — PROPOFOL 50 MG: 10 INJECTION, EMULSION INTRAVENOUS at 13:06

## 2020-12-31 RX ADMIN — MIDAZOLAM HYDROCHLORIDE 1 MG: 1 INJECTION, SOLUTION INTRAMUSCULAR; INTRAVENOUS at 12:47

## 2020-12-31 RX ADMIN — SODIUM CHLORIDE, POTASSIUM CHLORIDE, SODIUM LACTATE AND CALCIUM CHLORIDE 9 ML/HR: 600; 310; 30; 20 INJECTION, SOLUTION INTRAVENOUS at 10:52

## 2020-12-31 RX ADMIN — FAMOTIDINE 20 MG: 10 INJECTION, SOLUTION INTRAVENOUS at 12:47

## 2020-12-31 NOTE — ANESTHESIA PREPROCEDURE EVALUATION
Anesthesia Evaluation     Patient summary reviewed and Nursing notes reviewed   no history of anesthetic complications:  NPO Solid Status: > 8 hours  NPO Liquid Status: > 4 hours           Airway   Mallampati: II  TM distance: >3 FB  Neck ROM: full  No difficulty expected  Dental - normal exam     Pulmonary - negative pulmonary ROS and normal exam    breath sounds clear to auscultation  Cardiovascular - normal exam  Exercise tolerance: good (4-7 METS)    ECG reviewed  Rhythm: regular  Rate: normal    (-) dysrhythmias (Denies)    ROS comment: EKG 12/28/20:  - OTHERWISE NORMAL ECG -  Sinus rhythm  Low voltage, precordial leads  NO PRIOR TRACING AVAILABLE FOR COMPARISON    Neuro/Psych- negative ROS  (-) psychiatric history  GI/Hepatic/Renal/Endo    (+) obesity,       Musculoskeletal (-) negative ROS    Abdominal   (+) obese,    Substance History   (+) alcohol use (2-3 drinks a week),      OB/GYN negative ob/gyn ROS         Other - negative ROS                     Anesthesia Plan    ASA 2     MAC     intravenous induction     Anesthetic plan, all risks, benefits, and alternatives have been provided, discussed and informed consent has been obtained with: patient.  Use of blood products discussed with patient  Consented to blood products.

## 2020-12-31 NOTE — ANESTHESIA POSTPROCEDURE EVALUATION
Patient: Tamara Holstein    Procedure Summary     Date: 12/31/20 Room / Location:  [facility] /  [facility]    Anesthesia Start: 1252 Anesthesia Stop: 1322    Procedure: DILATATION AND CURETTAGE HYSTEROSCOPY (N/A Uterus) Diagnosis:       PCOS (polycystic ovarian syndrome)      Menometrorrhagia      (PCOS (polycystic ovarian syndrome) [E28.2])      (Menometrorrhagia [N92.1])    Surgeon: Yogi Patten MD Provider: Felipe Wiley CRNA    Anesthesia Type: MAC ASA Status: 2          Anesthesia Type: MAC    Vitals  Vitals Value Taken Time   /79 12/31/20 1330   Temp 98 °F (36.7 °C) 12/31/20 1330   Pulse 85 12/31/20 1330   Resp 15 12/31/20 1330   SpO2 98 % 12/31/20 1330           Post Anesthesia Care and Evaluation    Patient location during evaluation: PHASE II  Patient participation: complete - patient participated  Level of consciousness: awake and alert  Pain score: 2  Pain management: adequate  Airway patency: patent  Anesthetic complications: No anesthetic complications  PONV Status: none  Cardiovascular status: acceptable  Respiratory status: acceptable  Hydration status: acceptable

## 2021-01-04 LAB
CYTO UR: NORMAL
LAB AP CASE REPORT: NORMAL
PATH REPORT.FINAL DX SPEC: NORMAL
PATH REPORT.GROSS SPEC: NORMAL

## 2021-01-12 ENCOUNTER — OFFICE VISIT (OUTPATIENT)
Dept: OBSTETRICS AND GYNECOLOGY | Facility: CLINIC | Age: 41
End: 2021-01-12

## 2021-01-12 VITALS
BODY MASS INDEX: 38.8 KG/M2 | SYSTOLIC BLOOD PRESSURE: 130 MMHG | WEIGHT: 256 LBS | HEIGHT: 68 IN | DIASTOLIC BLOOD PRESSURE: 74 MMHG

## 2021-01-12 DIAGNOSIS — N92.1 MENOMETRORRHAGIA: ICD-10-CM

## 2021-01-12 DIAGNOSIS — N84.0 ENDOMETRIAL POLYP: ICD-10-CM

## 2021-01-12 DIAGNOSIS — Z13.9 SCREENING FOR CONDITION: Primary | ICD-10-CM

## 2021-01-12 LAB
B-HCG UR QL: NEGATIVE
BILIRUB BLD-MCNC: NEGATIVE MG/DL
CLARITY, POC: CLEAR
COLOR UR: ABNORMAL
GLUCOSE UR STRIP-MCNC: NEGATIVE MG/DL
INTERNAL NEGATIVE CONTROL: NEGATIVE
INTERNAL POSITIVE CONTROL: POSITIVE
KETONES UR QL: NEGATIVE
LEUKOCYTE EST, POC: NEGATIVE
Lab: 55
NITRITE UR-MCNC: NEGATIVE MG/ML
PH UR: 6 [PH] (ref 5–8)
PROT UR STRIP-MCNC: NEGATIVE MG/DL
RBC # UR STRIP: NEGATIVE /UL
SP GR UR: 1.03 (ref 1–1.03)
UROBILINOGEN UR QL: NORMAL

## 2021-01-12 PROCEDURE — 99214 OFFICE O/P EST MOD 30 MIN: CPT | Performed by: OBSTETRICS & GYNECOLOGY

## 2021-01-12 PROCEDURE — 81025 URINE PREGNANCY TEST: CPT | Performed by: OBSTETRICS & GYNECOLOGY

## 2021-01-12 PROCEDURE — 81002 URINALYSIS NONAUTO W/O SCOPE: CPT | Performed by: OBSTETRICS & GYNECOLOGY

## 2021-01-12 NOTE — PROGRESS NOTES
POSTOP VISIT    Patient Care Team:  Kelly Doll MD as PCP - General (Family Medicine)  -----------------------------------------------------HISTORY---------------------------------------------------    Chief Complaint: Pt here for postop check      40 y.o.  Patient's last menstrual period was 12/15/2020 (exact date).    HPI:  Pt here for postop check for procedure: Dilatation And Curettage Hysteroscopy completed on date: 2020  Pt reports complaints: none.  Tolerating diet well, normal bladder and bowel function, incision/s healing well.  Vaginal bleeding? no    PFSH:   1.    Past Medical History:   Diagnosis Date   • Anxiety    • Excessive vaginal bleeding     scheduled for d&c   • Paroxysmal atrial fibrillation (CMS/HCC)     no problems since 2018   • PCOS (polycystic ovarian syndrome)      2.   Family History   Problem Relation Age of Onset   • Cancer Mother    • Atrial fibrillation Father    • Mental illness Brother          PAST HISTORY REVIEWED:  1.   Past Surgical History:   Procedure Laterality Date   • D&C HYSTEROSCOPY N/A 2020    Procedure: DILATATION AND CURETTAGE HYSTEROSCOPY;  Surgeon: Yogi Patten MD;  [facility];  Service: Obstetrics/Gynecology;  Laterality: N/A;   • KNEE ARTHROSCOPY Left       2.   Current Outpatient Medications:   •  cetirizine (zyrTEC) 10 MG tablet, Take 10 mg by mouth Daily., Disp: , Rfl:   •  Cetirizine HCl (ZyrTEC Allergy) 10 MG capsule, 10 mg Every Morning., Disp: , Rfl:   •  ELDERBERRY PO, Take 1 tablet by mouth Daily., Disp: , Rfl:   •  minocycline (DYNACIN) 50 MG tablet, Take 50 mg by mouth Every Morning. For acne, Disp: , Rfl:   •  multivitamin with minerals tablet tablet, Take 1 tablet by mouth Daily., Disp: , Rfl:   3.   Allergies   Allergen Reactions   • Sulfa Antibiotics Hives       ROS:  Review of Systems   Constitutional: Negative.    HENT: Negative.    Eyes: Negative.    Respiratory: Negative.   "  Cardiovascular: Negative.    Gastrointestinal: Negative.    Endocrine: Negative.    Genitourinary: Negative.    Musculoskeletal: Negative.    Skin: Negative.    Allergic/Immunologic: Negative.    Neurological: Negative.    Hematological: Negative.    Psychiatric/Behavioral: Negative.    :    -----------------------------------------------PHYSICAL EXAM----------------------------------------------    Vital Signs: /74   Ht 172.7 cm (67.99\")   Wt 116 kg (256 lb)   LMP 12/15/2020 (Exact Date)   Breastfeeding No   BMI 38.93 kg/m²    Flowsheet Rows      First Filed Value   Admission Height  172.7 cm (67.99\") Documented at 01/12/2021 0850   Admission Weight  116 kg (256 lb) Documented at 01/12/2021 0850          Physical Exam  Vitals signs and nursing note reviewed.   Constitutional:       Appearance: She is well-developed.   HENT:      Head: Normocephalic and atraumatic.   Neck:      Musculoskeletal: Normal range of motion.   Pulmonary:      Effort: Pulmonary effort is normal.   Abdominal:      General: Bowel sounds are normal. There is no distension.      Palpations: Abdomen is soft. There is no mass.      Tenderness: There is no abdominal tenderness. There is no guarding or rebound.      Hernia: No hernia is present.   Musculoskeletal: Normal range of motion.   Skin:     General: Skin is warm and dry.   Neurological:      Mental Status: She is alert and oriented to person, place, and time.   Psychiatric:         Behavior: Behavior normal.         Thought Content: Thought content normal.         Judgment: Judgment normal.         -----------------------------------------------MEDICAL DECISION MAKING-----------------------------      DATA Review & labs ordered:     1.   Lab Results (last 24 hours)     Procedure Component Value Units Date/Time    POC Urinalysis Dipstick [[id number]]  (Abnormal) Collected: 01/12/21 0917    Specimen: Urine Updated: 01/12/21 0918     Color Dark Yellow     Clarity, UA Clear     " Glucose, UA Negative mg/dL      Bilirubin Negative     Ketones, UA Negative     Specific Gravity  1.035     Blood, UA Negative     pH, Urine 6.0     Protein, POC Negative mg/dL      Urobilinogen, UA Normal     Leukocytes Negative     Nitrite, UA Negative    POC Pregnancy, Urine [[id number]]  (Normal) Collected: 01/12/21 0917    Specimen: Urine Updated: 01/12/21 0917     HCG, Urine, QL Negative     Lot Number 55     Internal Positive Control Positive     Internal Negative Control Negative        2.   Imaging Results (Last 24 Hours)     ** No results found for the last 24 hours. **        3.   ECG/EMG Results (most recent)     None        4. Old records reviewed?  yes  5. Old records ordered?  no  6. Labs ordered?: ua and hcg: wnl  7. Imaging other than ultrasound ordered?: no  8. Diagnoses and/or chronic conditions reviewed with pt:       Diagnoses and all orders for this visit:    1. Screening for condition (Primary)  -     POC Urinalysis Dipstick  -     POC Pregnancy, Urine    2. Menometrorrhagia    3. Endometrial polyp      9. Risk counseling done:  yes  10. Ultrasound ordered and reviewed?   no  11.  Path report reviewed? yes    IMPRESSION & DIAGNOSIS:      Endometrial polyp  Established problem/s? no   Worsening? no  New Problem/s? yes   Additional workup planned? no      PLAN:     Expectant management.  Ablation offered if abnormal bleeding persists.    RTO No follow-ups on file. FOR: annual      TIME: More than 50% of time spent in counseling and/or coordination of care.Time spent in counseling 30 min.  Counseling included the following topics postop bleeding with prognosis, differential diagnosis, risks, benefits of treatment, instructions, compliance and/or risk reduction and alternatives.       Yogi Patten MD  09:20 EST  01/12/21

## 2021-03-11 ENCOUNTER — IMMUNIZATION (OUTPATIENT)
Dept: VACCINE CLINIC | Facility: HOSPITAL | Age: 41
End: 2021-03-11

## 2021-03-11 PROCEDURE — 91300 HC SARSCOV02 VAC 30MCG/0.3ML IM: CPT | Performed by: OBSTETRICS & GYNECOLOGY

## 2021-03-11 PROCEDURE — 0001A: CPT | Performed by: OBSTETRICS & GYNECOLOGY

## 2021-04-01 ENCOUNTER — IMMUNIZATION (OUTPATIENT)
Dept: VACCINE CLINIC | Facility: HOSPITAL | Age: 41
End: 2021-04-01

## 2021-04-01 PROCEDURE — 0002A: CPT | Performed by: OBSTETRICS & GYNECOLOGY

## 2021-04-01 PROCEDURE — 91300 HC SARSCOV02 VAC 30MCG/0.3ML IM: CPT | Performed by: OBSTETRICS & GYNECOLOGY

## 2022-01-18 ENCOUNTER — OFFICE VISIT (OUTPATIENT)
Dept: OBSTETRICS AND GYNECOLOGY | Facility: CLINIC | Age: 42
End: 2022-01-18

## 2022-01-18 VITALS
DIASTOLIC BLOOD PRESSURE: 88 MMHG | BODY MASS INDEX: 40.62 KG/M2 | WEIGHT: 268 LBS | SYSTOLIC BLOOD PRESSURE: 122 MMHG | HEIGHT: 68 IN

## 2022-01-18 DIAGNOSIS — N84.0 ENDOMETRIAL POLYP: ICD-10-CM

## 2022-01-18 DIAGNOSIS — Z01.419 WELL WOMAN EXAM: ICD-10-CM

## 2022-01-18 DIAGNOSIS — Z11.51 SPECIAL SCREENING EXAMINATION FOR HUMAN PAPILLOMAVIRUS (HPV): ICD-10-CM

## 2022-01-18 DIAGNOSIS — N92.1 MENOMETRORRHAGIA: ICD-10-CM

## 2022-01-18 DIAGNOSIS — Z01.419 ROUTINE GYNECOLOGICAL EXAMINATION: ICD-10-CM

## 2022-01-18 DIAGNOSIS — E28.2 PCOS (POLYCYSTIC OVARIAN SYNDROME): ICD-10-CM

## 2022-01-18 DIAGNOSIS — E66.01 MORBID OBESITY WITH BMI OF 40.0-44.9, ADULT: ICD-10-CM

## 2022-01-18 DIAGNOSIS — Z01.419 PAP SMEAR, LOW-RISK: Primary | ICD-10-CM

## 2022-01-18 DIAGNOSIS — Z80.3 FAMILY HISTORY OF BREAST CANCER IN MOTHER: ICD-10-CM

## 2022-01-18 DIAGNOSIS — Z13.9 SCREENING FOR CONDITION: ICD-10-CM

## 2022-01-18 LAB

## 2022-01-18 PROCEDURE — 99396 PREV VISIT EST AGE 40-64: CPT | Performed by: OBSTETRICS & GYNECOLOGY

## 2022-01-18 PROCEDURE — 81025 URINE PREGNANCY TEST: CPT | Performed by: OBSTETRICS & GYNECOLOGY

## 2022-01-18 PROCEDURE — 81002 URINALYSIS NONAUTO W/O SCOPE: CPT | Performed by: OBSTETRICS & GYNECOLOGY

## 2022-01-18 NOTE — PROGRESS NOTES
GYN Annual Exam     CC- Here for annual exam.     Pt new to practice? No  Pt new to me? No     Tamara Holstein is a 41 y.o.  female who presents for annual well woman exam. Patient's last menstrual period was 2022.    Problems in addition to need for annual: progressive menorrhagia refractory to medical and surgical therapy.     HPI: History of Present Illness    PMHX:  Patient Active Problem List   Diagnosis   • Other acne   • PCOS (polycystic ovarian syndrome)   • Pelvic pain   • hx: ASCUS with positive high risk HPV cervical   • Morbid obesity with BMI of 40.0-44.9, adult (HCC)   • Menometrorrhagia   • Endometrial polyp   • Family history of breast cancer in mother   ; otherwise none    OB History        0    Para   0    Term   0       0    AB   0    Living   0       SAB   0    IAB   0    Ectopic   0    Molar   0    Multiple   0    Live Births   0                  Past Medical History:   Diagnosis Date   • Anxiety    • Excessive vaginal bleeding     scheduled for d&c   • Paroxysmal atrial fibrillation (HCC)     no problems since 2018   • PCOS (polycystic ovarian syndrome)        Past Surgical History:   Procedure Laterality Date   • D & C HYSTEROSCOPY N/A 2020    Procedure: DILATATION AND CURETTAGE HYSTEROSCOPY;  Surgeon: Yogi Patten MD;  Location: [facility];  Service: Obstetrics/Gynecology;  Laterality: N/A;   • KNEE ARTHROSCOPY Left          Current Outpatient Medications:   •  cetirizine (zyrTEC) 10 MG tablet, Take 10 mg by mouth Daily., Disp: , Rfl:   •  Cetirizine HCl (ZyrTEC Allergy) 10 MG capsule, 10 mg Every Morning., Disp: , Rfl:   •  ELDERBERRY PO, Take 1 tablet by mouth Daily., Disp: , Rfl:   •  minocycline (DYNACIN) 50 MG tablet, Take 50 mg by mouth Every Morning. For acne, Disp: , Rfl:   •  multivitamin with minerals tablet tablet, Take 1 tablet by mouth Daily., Disp: , Rfl:     Allergies   Allergen Reactions   • Sulfa Antibiotics Hives  "      Social History     Tobacco Use   • Smoking status: Never Smoker   • Smokeless tobacco: Never Used   Substance Use Topics   • Alcohol use: Yes     Alcohol/week: 2.0 standard drinks     Types: 2 Glasses of wine per week     Comment: weekly   • Drug use: Never       Tamara Holstein  reports that she has never smoked. She has never used smokeless tobacco..           Family History   Problem Relation Age of Onset   • Cancer Mother    • Atrial fibrillation Father    • Mental illness Brother        Review of Systems   Constitutional: Negative.    HENT: Negative.    Eyes: Negative.    Respiratory: Negative.    Cardiovascular: Negative.    Gastrointestinal: Negative.    Endocrine: Negative.    Genitourinary: Positive for menstrual problem and vaginal bleeding.   Musculoskeletal: Negative.    Skin: Negative.    Allergic/Immunologic: Negative.    Neurological: Negative.    Hematological: Negative.    Psychiatric/Behavioral: Negative.        Patient reports that she is not currently experiencing any symptoms of urinary incontinence.      noTESTED FOR CHLAMYDIA?    EXAM:  /88   Ht 172.7 cm (67.99\")   Wt 122 kg (268 lb)   LMP 01/07/2022   Breastfeeding No   BMI 40.76 kg/m²     Labs:   Lab Results (last 24 hours)     Procedure Component Value Units Date/Time    POC Pregnancy, Urine [[id number]] Collected: 01/18/22 1136    Specimen: Urine Updated: 01/18/22 1136     HCG, Urine, QL Negative     Lot Number [id number]     Internal Positive Control Positive     Internal Negative Control Negative     Expiration Date 04/30/2023    POC Urinalysis Dipstick [[id number]] Collected: 01/18/22 1136    Specimen: Urine Updated: 01/18/22 1136     Color Yellow     Clarity, UA Clear     Glucose, UA Negative mg/dL      Bilirubin Negative     Ketones, UA Negative     Specific Gravity  1.005     Blood, UA Negative     pH, Urine 5.0     Protein, POC Negative mg/dL      Urobilinogen, UA Normal     Leukocytes Negative     Nitrite, UA Negative "          Physical Exam  Vitals and nursing note reviewed. Exam conducted with a chaperone present.   Constitutional:       General: She is not in acute distress.     Appearance: She is well-developed. She is not diaphoretic.   HENT:      Head: Normocephalic and atraumatic.      Nose: Nose normal.   Eyes:      Extraocular Movements: Extraocular movements intact.   Cardiovascular:      Rate and Rhythm: Normal rate.   Pulmonary:      Effort: Pulmonary effort is normal.   Chest:   Breasts: Breasts are symmetrical.      Right: Normal. No mass, nipple discharge, skin change, tenderness or axillary adenopathy.      Left: Normal. No mass, nipple discharge, skin change, tenderness or axillary adenopathy.       Abdominal:      General: There is no distension.      Palpations: Abdomen is soft. There is no mass.      Tenderness: There is no abdominal tenderness. There is no guarding.   Genitourinary:     General: Normal vulva.      Pubic Area: No rash.       Vagina: Normal. No vaginal discharge.      Cervix: Normal.      Uterus: Normal.       Adnexa: Right adnexa normal and left adnexa normal.   Musculoskeletal:         General: No tenderness or deformity. Normal range of motion.      Cervical back: Normal range of motion.   Lymphadenopathy:      Upper Body:      Right upper body: No axillary adenopathy.      Left upper body: No axillary adenopathy.   Skin:     General: Skin is warm and dry.      Coloration: Skin is not pale.      Findings: No erythema or rash.   Neurological:      Mental Status: She is alert and oriented to person, place, and time.   Psychiatric:         Behavior: Behavior normal.         Thought Content: Thought content normal.         Judgment: Judgment normal.            As part of wellness and prevention, the following topics were discussed with the patient: healthy weight, substance abuse/misuse, mental health, encouraging self breast exam, and other counseling and guidance done:  Nutrition, physical  activity, healthy weight, injury prevention, misuse of tobacco, alcohol and drugs, sexual behavior and STDs, contraception, dental health, mental health, immunizations breast cancer screening and exams.    I saw the patient with a face mask, gloves and eye protection  The patient herself was masked.  Social distancing was observed as appropriate. All COVID precautions observed.  Pt encouraged to receive COVID vaccine if she hadn't already done this.     Assessment     1) GYN annual well woman exam.   2) PAP done today? Yes  3) problems addressed: yes    MAMMOGRAM UP TO DATE IF AGE APPROPRIATE?  No  (if no, pt encouraged to schedule; risks of undiagnosed breast cancer reviewed with pt if she does not have a mammogram)        Fhx breast cancer? Yes    Fhx ovarian cancer? No    Fhx colon cancer? No    Invitae testing offered? Yes           Plan       Follow up prn or one year.    Pt instructed to call for results of any testing done today and that failure to call if she has not heard from us could result in a bad outcome.  Pt verbalized her understanding.     Diagnoses and all orders for this visit:    1. Pap smear, low-risk (Primary)  -     IgP, Aptima HPV    2. Routine gynecological examination  -     POC Urinalysis Dipstick  -     POC Pregnancy, Urine  -     IgP, Aptima HPV    3. Special screening examination for human papillomavirus (HPV)  -     IgP, Aptima HPV    4. Well woman exam    5. PCOS (polycystic ovarian syndrome)    6. Morbid obesity with BMI of 40.0-44.9, adult (HCC)    7. Family history of breast cancer in mother  -     Mammo Screening Digital Tomosynthesis Bilateral With CAD; Future    8. Screening for condition  -     Mammo Screening Digital Tomosynthesis Bilateral With CAD; Future    9. Menometrorrhagia    10. Endometrial polyp        RTO Return in about 2 weeks (around 2/1/2022) for Recheck. and sonohystogram, embx.  Pt has nulliparous cervix and uterus.  Ablation may not work.       Yogi Watson  MD [place]  [unfilled]  11:52 EST

## 2022-01-20 LAB
CYTOLOGIST CVX/VAG CYTO: NORMAL
CYTOLOGY CVX/VAG DOC CYTO: NORMAL
CYTOLOGY CVX/VAG DOC THIN PREP: NORMAL
DX ICD CODE: NORMAL
HIV 1 & 2 AB SER-IMP: NORMAL
HPV I/H RISK 4 DNA CVX QL PROBE+SIG AMP: NEGATIVE
OTHER STN SPEC: NORMAL
STAT OF ADQ CVX/VAG CYTO-IMP: NORMAL

## 2022-03-15 ENCOUNTER — LAB (OUTPATIENT)
Dept: LAB | Facility: HOSPITAL | Age: 42
End: 2022-03-15

## 2022-03-15 ENCOUNTER — OFFICE VISIT (OUTPATIENT)
Dept: OBSTETRICS AND GYNECOLOGY | Facility: CLINIC | Age: 42
End: 2022-03-15

## 2022-03-15 VITALS — HEIGHT: 68 IN | BODY MASS INDEX: 41.07 KG/M2 | WEIGHT: 271 LBS

## 2022-03-15 DIAGNOSIS — E66.01 MORBID OBESITY WITH BMI OF 40.0-44.9, ADULT: ICD-10-CM

## 2022-03-15 DIAGNOSIS — N92.1 MENOMETRORRHAGIA: ICD-10-CM

## 2022-03-15 DIAGNOSIS — N84.0 ENDOMETRIAL POLYP: ICD-10-CM

## 2022-03-15 DIAGNOSIS — R10.2 PELVIC PAIN: ICD-10-CM

## 2022-03-15 DIAGNOSIS — R87.810 ASCUS WITH POSITIVE HIGH RISK HPV CERVICAL: ICD-10-CM

## 2022-03-15 DIAGNOSIS — R87.610 ASCUS WITH POSITIVE HIGH RISK HPV CERVICAL: ICD-10-CM

## 2022-03-15 DIAGNOSIS — Z01.818 PRE-OP EVALUATION: Primary | ICD-10-CM

## 2022-03-15 LAB
B-HCG UR QL: NEGATIVE
BILIRUB BLD-MCNC: NEGATIVE MG/DL
CLARITY, POC: CLEAR
COLOR UR: YELLOW
EXPIRATION DATE: NORMAL
GLUCOSE UR STRIP-MCNC: NEGATIVE MG/DL
INTERNAL NEGATIVE CONTROL: NEGATIVE
INTERNAL POSITIVE CONTROL: POSITIVE
KETONES UR QL: NEGATIVE
LEUKOCYTE EST, POC: NEGATIVE
Lab: 55
NITRITE UR-MCNC: NEGATIVE MG/ML
PH UR: 8 [PH] (ref 5–8)
PROT UR STRIP-MCNC: NEGATIVE MG/DL
RBC # UR STRIP: ABNORMAL /UL
SARS-COV-2 RNA PNL SPEC NAA+PROBE: NOT DETECTED
SP GR UR: 1 (ref 1–1.03)
UROBILINOGEN UR QL: NORMAL

## 2022-03-15 PROCEDURE — 87635 SARS-COV-2 COVID-19 AMP PRB: CPT | Performed by: OBSTETRICS & GYNECOLOGY

## 2022-03-15 PROCEDURE — 58340 CATHETER FOR HYSTEROGRAPHY: CPT | Performed by: OBSTETRICS & GYNECOLOGY

## 2022-03-15 PROCEDURE — 81002 URINALYSIS NONAUTO W/O SCOPE: CPT | Performed by: OBSTETRICS & GYNECOLOGY

## 2022-03-15 PROCEDURE — 81025 URINE PREGNANCY TEST: CPT | Performed by: OBSTETRICS & GYNECOLOGY

## 2022-03-15 NOTE — PROGRESS NOTES
PROCEDURE NOTE FOR SONOHYSTOGRAM OR SALINE INFUSION SONOGRAM (SIS);   PROCEDURE AND INTERPRETATION.        INDICATION/CC/PREOP DX: menometrorrhagia    POSTOP DX: same    Informed consent obtained.    Pt present in ultrasound room, scount ultrasound was already done by technician.    ANESTHESIA USED: none    In dorsolithotomy position, open graves speculum placed in the vagina.  Pap smear done? no    The  cervix was not seen due to position.  Procedure discontinued.    All instruments were removed.    Pt tolerated procedure well and left in satisfactory condition.    Instructions and precautions given.      IMPRESSION/INTERPRETATION:  Menometrorrhagia.    PLAN:  Dx hysteroscopy, D&C    RISKS, ALTERNATIVES, COMPLICATIONS OF THE PROCEDURE INCLUDING BUT NOT LIMITED TO:    INTRAOPERATIVE RISKS: INJURY TO INTERNAL AND ADJACENT ORGANS AND STRUCTURES (BOWEL, BLADDER, URETER,BLOOD VESSELS) OR HEMORRHAGE REQUIRING FURTHER SURGERY (LAPAROTOMY),  POSSIBLE NON-DIAGNOSTIC FINDINGS, DISCOVERY OF POSSIBLE MALIGNANCY, INFECTION, AND DEATH;   POSTOP COMPLICATIONS: BLEEDING, INFECTION (REQUIRING POSSIBLE REOPERATION), FAILURE OF GOAL OF SURGERY AND RECURRENCE OF ORIGINAL SYMPTOMS, PNEUMONIA, PULMONARY EMBOLISM, AND DEATH;  WERE EXPLAINED TO THE PT WHO VERBALIZED HER UNDERSTANDING.          Yogi Patten MD  13:47 EDT  @today@

## 2022-03-16 ENCOUNTER — ANESTHESIA EVENT (OUTPATIENT)
Dept: PERIOP | Facility: HOSPITAL | Age: 42
End: 2022-03-16

## 2022-03-16 RX ORDER — FLUOXETINE 10 MG/1
10 CAPSULE ORAL NIGHTLY
COMMUNITY

## 2022-03-17 ENCOUNTER — ANESTHESIA (OUTPATIENT)
Dept: PERIOP | Facility: HOSPITAL | Age: 42
End: 2022-03-17

## 2022-03-17 ENCOUNTER — HOSPITAL ENCOUNTER (OUTPATIENT)
Facility: HOSPITAL | Age: 42
Setting detail: HOSPITAL OUTPATIENT SURGERY
Discharge: HOME | End: 2022-03-17
Attending: OBSTETRICS & GYNECOLOGY | Admitting: OBSTETRICS & GYNECOLOGY

## 2022-03-17 VITALS
DIASTOLIC BLOOD PRESSURE: 53 MMHG | SYSTOLIC BLOOD PRESSURE: 100 MMHG | OXYGEN SATURATION: 100 % | HEART RATE: 72 BPM | HEIGHT: 68 IN | BODY MASS INDEX: 25.55 KG/M2 | RESPIRATION RATE: 16 BRPM | WEIGHT: 168.6 LBS | TEMPERATURE: 97.8 F

## 2022-03-17 DIAGNOSIS — N92.1 MENOMETRORRHAGIA: ICD-10-CM

## 2022-03-17 DIAGNOSIS — R10.2 PELVIC PAIN: ICD-10-CM

## 2022-03-17 LAB — HCG SERPL QL: NEGATIVE

## 2022-03-17 PROCEDURE — 88305 TISSUE EXAM BY PATHOLOGIST: CPT | Performed by: OBSTETRICS & GYNECOLOGY

## 2022-03-17 PROCEDURE — 25010000002 PROPOFOL 10 MG/ML EMULSION: Performed by: REGISTERED NURSE

## 2022-03-17 PROCEDURE — 84703 CHORIONIC GONADOTROPIN ASSAY: CPT | Performed by: NURSE ANESTHETIST, CERTIFIED REGISTERED

## 2022-03-17 PROCEDURE — 25010000002 MIDAZOLAM PER 1MG: Performed by: ANESTHESIOLOGY

## 2022-03-17 PROCEDURE — 25010000002 HYDROMORPHONE 1 MG/ML SOLUTION: Performed by: REGISTERED NURSE

## 2022-03-17 PROCEDURE — 25010000002 ONDANSETRON PER 1 MG: Performed by: ANESTHESIOLOGY

## 2022-03-17 PROCEDURE — 58558 HYSTEROSCOPY BIOPSY: CPT | Performed by: OBSTETRICS & GYNECOLOGY

## 2022-03-17 PROCEDURE — 25010000002 FENTANYL CITRATE (PF) 50 MCG/ML SOLUTION: Performed by: REGISTERED NURSE

## 2022-03-17 RX ORDER — DEXMEDETOMIDINE HYDROCHLORIDE 100 UG/ML
INJECTION, SOLUTION INTRAVENOUS AS NEEDED
Status: DISCONTINUED | OUTPATIENT
Start: 2022-03-17 | End: 2022-03-17 | Stop reason: SURG

## 2022-03-17 RX ORDER — ONDANSETRON 2 MG/ML
4 INJECTION INTRAMUSCULAR; INTRAVENOUS ONCE AS NEEDED
Status: DISCONTINUED | OUTPATIENT
Start: 2022-03-17 | End: 2022-03-17 | Stop reason: HOSPADM

## 2022-03-17 RX ORDER — ONDANSETRON 2 MG/ML
4 INJECTION INTRAMUSCULAR; INTRAVENOUS ONCE AS NEEDED
Status: COMPLETED | OUTPATIENT
Start: 2022-03-17 | End: 2022-03-17

## 2022-03-17 RX ORDER — OXYCODONE AND ACETAMINOPHEN 7.5; 325 MG/1; MG/1
1 TABLET ORAL ONCE AS NEEDED
Status: COMPLETED | OUTPATIENT
Start: 2022-03-17 | End: 2022-03-17

## 2022-03-17 RX ORDER — SODIUM CHLORIDE, SODIUM LACTATE, POTASSIUM CHLORIDE, CALCIUM CHLORIDE 600; 310; 30; 20 MG/100ML; MG/100ML; MG/100ML; MG/100ML
100 INJECTION, SOLUTION INTRAVENOUS CONTINUOUS
Status: DISCONTINUED | OUTPATIENT
Start: 2022-03-17 | End: 2022-03-17 | Stop reason: HOSPADM

## 2022-03-17 RX ORDER — PROPOFOL 10 MG/ML
VIAL (ML) INTRAVENOUS AS NEEDED
Status: DISCONTINUED | OUTPATIENT
Start: 2022-03-17 | End: 2022-03-17 | Stop reason: SURG

## 2022-03-17 RX ORDER — MIDAZOLAM HYDROCHLORIDE 2 MG/2ML
1 INJECTION, SOLUTION INTRAMUSCULAR; INTRAVENOUS
Status: DISCONTINUED | OUTPATIENT
Start: 2022-03-17 | End: 2022-03-17 | Stop reason: HOSPADM

## 2022-03-17 RX ORDER — MAGNESIUM HYDROXIDE 1200 MG/15ML
LIQUID ORAL AS NEEDED
Status: DISCONTINUED | OUTPATIENT
Start: 2022-03-17 | End: 2022-03-17 | Stop reason: HOSPADM

## 2022-03-17 RX ORDER — SODIUM CHLORIDE 0.9 % (FLUSH) 0.9 %
10 SYRINGE (ML) INJECTION AS NEEDED
Status: DISCONTINUED | OUTPATIENT
Start: 2022-03-17 | End: 2022-03-17 | Stop reason: HOSPADM

## 2022-03-17 RX ORDER — ACETAMINOPHEN 500 MG
1000 TABLET ORAL ONCE
Status: COMPLETED | OUTPATIENT
Start: 2022-03-17 | End: 2022-03-17

## 2022-03-17 RX ORDER — KETAMINE HYDROCHLORIDE 100 MG/ML
INJECTION INTRAMUSCULAR; INTRAVENOUS AS NEEDED
Status: DISCONTINUED | OUTPATIENT
Start: 2022-03-17 | End: 2022-03-17 | Stop reason: SURG

## 2022-03-17 RX ORDER — SODIUM CHLORIDE, SODIUM LACTATE, POTASSIUM CHLORIDE, CALCIUM CHLORIDE 600; 310; 30; 20 MG/100ML; MG/100ML; MG/100ML; MG/100ML
9 INJECTION, SOLUTION INTRAVENOUS CONTINUOUS PRN
Status: DISCONTINUED | OUTPATIENT
Start: 2022-03-17 | End: 2022-03-17 | Stop reason: HOSPADM

## 2022-03-17 RX ORDER — SODIUM CHLORIDE 9 MG/ML
40 INJECTION, SOLUTION INTRAVENOUS AS NEEDED
Status: DISCONTINUED | OUTPATIENT
Start: 2022-03-17 | End: 2022-03-17 | Stop reason: HOSPADM

## 2022-03-17 RX ORDER — SODIUM CHLORIDE 0.9 % (FLUSH) 0.9 %
10 SYRINGE (ML) INJECTION EVERY 12 HOURS SCHEDULED
Status: DISCONTINUED | OUTPATIENT
Start: 2022-03-17 | End: 2022-03-17 | Stop reason: HOSPADM

## 2022-03-17 RX ORDER — LIDOCAINE HYDROCHLORIDE 10 MG/ML
0.5 INJECTION, SOLUTION EPIDURAL; INFILTRATION; INTRACAUDAL; PERINEURAL ONCE AS NEEDED
Status: DISCONTINUED | OUTPATIENT
Start: 2022-03-17 | End: 2022-03-17 | Stop reason: HOSPADM

## 2022-03-17 RX ORDER — FENTANYL CITRATE 50 UG/ML
INJECTION, SOLUTION INTRAMUSCULAR; INTRAVENOUS AS NEEDED
Status: DISCONTINUED | OUTPATIENT
Start: 2022-03-17 | End: 2022-03-17 | Stop reason: SURG

## 2022-03-17 RX ORDER — FENTANYL CITRATE 50 UG/ML
25 INJECTION, SOLUTION INTRAMUSCULAR; INTRAVENOUS
Status: DISCONTINUED | OUTPATIENT
Start: 2022-03-17 | End: 2022-03-17 | Stop reason: HOSPADM

## 2022-03-17 RX ORDER — FAMOTIDINE 10 MG/ML
20 INJECTION, SOLUTION INTRAVENOUS
Status: COMPLETED | OUTPATIENT
Start: 2022-03-17 | End: 2022-03-17

## 2022-03-17 RX ADMIN — PROPOFOL 100 MG: 10 INJECTION, EMULSION INTRAVENOUS at 13:15

## 2022-03-17 RX ADMIN — PROPOFOL 75 MCG/KG/MIN: 10 INJECTION, EMULSION INTRAVENOUS at 13:16

## 2022-03-17 RX ADMIN — MIDAZOLAM HYDROCHLORIDE 1 MG: 1 INJECTION, SOLUTION INTRAMUSCULAR; INTRAVENOUS at 12:57

## 2022-03-17 RX ADMIN — HYDROMORPHONE HYDROCHLORIDE 0.5 MG: 1 INJECTION, SOLUTION INTRAMUSCULAR; INTRAVENOUS; SUBCUTANEOUS at 14:31

## 2022-03-17 RX ADMIN — KETAMINE HYDROCHLORIDE 20 MG: 100 INJECTION INTRAMUSCULAR; INTRAVENOUS at 13:16

## 2022-03-17 RX ADMIN — SODIUM CHLORIDE, POTASSIUM CHLORIDE, SODIUM LACTATE AND CALCIUM CHLORIDE 9 ML/HR: 600; 310; 30; 20 INJECTION, SOLUTION INTRAVENOUS at 11:37

## 2022-03-17 RX ADMIN — HYDROMORPHONE HYDROCHLORIDE 0.5 MG: 1 INJECTION, SOLUTION INTRAMUSCULAR; INTRAVENOUS; SUBCUTANEOUS at 13:58

## 2022-03-17 RX ADMIN — FAMOTIDINE 20 MG: 10 INJECTION, SOLUTION INTRAVENOUS at 11:37

## 2022-03-17 RX ADMIN — DEXMEDETOMIDINE 4 MCG: 100 INJECTION, SOLUTION, CONCENTRATE INTRAVENOUS at 13:15

## 2022-03-17 RX ADMIN — ONDANSETRON 4 MG: 2 INJECTION INTRAMUSCULAR; INTRAVENOUS at 11:36

## 2022-03-17 RX ADMIN — FENTANYL CITRATE 50 MCG: 50 INJECTION INTRAMUSCULAR; INTRAVENOUS at 13:16

## 2022-03-17 RX ADMIN — OXYCODONE AND ACETAMINOPHEN 1 TABLET: 7.5; 325 TABLET ORAL at 14:52

## 2022-03-17 RX ADMIN — DEXMEDETOMIDINE 4 MCG: 100 INJECTION, SOLUTION, CONCENTRATE INTRAVENOUS at 13:30

## 2022-03-17 RX ADMIN — HYDROMORPHONE HYDROCHLORIDE 0.5 MG: 1 INJECTION, SOLUTION INTRAMUSCULAR; INTRAVENOUS; SUBCUTANEOUS at 14:13

## 2022-03-17 RX ADMIN — ACETAMINOPHEN 1000 MG: 500 TABLET ORAL at 11:36

## 2022-03-17 RX ADMIN — HYDROMORPHONE HYDROCHLORIDE 0.5 MG: 1 INJECTION, SOLUTION INTRAMUSCULAR; INTRAVENOUS; SUBCUTANEOUS at 13:49

## 2022-03-17 NOTE — ANESTHESIA POSTPROCEDURE EVALUATION
Patient: Tamara Holstein    Procedure Summary     Date: 03/17/22 Room / Location:  [facility] /  [facility]    Anesthesia Start: 1313 Anesthesia Stop: 1347    Procedure: DILATATION AND CURETTAGE HYSTEROSCOPY (N/A Uterus) Diagnosis:       Menometrorrhagia      Pelvic pain      (Menometrorrhagia [N92.1])      (Pelvic pain [R10.2])    Surgeons: Yogi Patten MD Provider: Fly Jensen CRNA    Anesthesia Type: MAC ASA Status: 2          Anesthesia Type: MAC    Vitals  Vitals Value Taken Time   /62 03/17/22 1500   Temp 97.8 °F (36.6 °C) 03/17/22 1343   Pulse 64 03/17/22 1500   Resp 16 03/17/22 1500   SpO2 96 % 03/17/22 1500           Post Anesthesia Care and Evaluation    Patient location during evaluation: PHASE II  Patient participation: complete - patient participated  Level of consciousness: awake  Pain management: adequate  Airway patency: patent  Anesthetic complications: No anesthetic complications  PONV Status: none  Cardiovascular status: acceptable  Respiratory status: acceptable  Hydration status: acceptable

## 2022-03-17 NOTE — ANESTHESIA PREPROCEDURE EVALUATION
Anesthesia Evaluation     Patient summary reviewed and Nursing notes reviewed   no history of anesthetic complications:  NPO Solid Status: > 8 hours  NPO Liquid Status: > 4 hours           Airway   Mallampati: I  TM distance: >3 FB  Neck ROM: full  No difficulty expected  Dental - normal exam     Comment: Crowns-one bottom right molar, one upper left molar    Pulmonary - negative pulmonary ROS    breath sounds clear to auscultation  Cardiovascular - normal exam  Exercise tolerance: good (4-7 METS)    Rhythm: regular  Rate: normal    (+) dysrhythmias (no problems for several years, no blood thinners, told due to anxiety) Paroxysmal Atrial Fib,       Neuro/Psych  (+) headaches (migraines), psychiatric history Anxiety,    GI/Hepatic/Renal/Endo      Musculoskeletal (-) negative ROS    Abdominal  - normal exam   Substance History   (-) drug useAlcohol use: occ, none for 2 weeks.     OB/GYN    (-)  Pregnant    Comment: PCOC, menometrorrhagia      Other        ROS/Med Hx Other: gatorade 0800                Anesthesia Plan    ASA 2     MAC   (MAC with GA as needed agreeable to patient.)  intravenous induction     Anesthetic plan, all risks, benefits, and alternatives have been provided, discussed and informed consent has been obtained with: patient and mother.        CODE STATUS:

## 2022-03-17 NOTE — OP NOTE
OPERATIVE REPORT      PROCEDURE:   DIAGNOSTIC HYSTEROSCOPY, DILATATION & CURETTAGE    PREOP DIAGNOSIS:  Menometrorrhagia, hx PCOS, increased bmi, hx endometrial polyp    POSTOP DIAGNOSIS:  same    SURGEON:   Simona    ASSIST:  none    ANESTHESIA:  MAC    EBL:   2cc    IVFS:  400cc    URINE OUTPUT:  200cc    COMPLICATIONS:  none    FINDINGS:  Thickened endometrium    SPECIMENS:  Endometrial curettings    ANTIBIOTICS:  None        DESCRIPTION OF THE PROCEDURE:     After informed consent was obtained, pt was taken to the OR and placed on the table in the DORSOLITHOTOMY position.  LMA was induced without difficulty.  Time out was done, no antibiotics were given. Pt was prepped and draped in the usual fashion.    An open graves speculum was placed in the vagina and the anterior lip of the cervix was grasped with a single toothed tenaculum.  The cervix appeared normal.  The uterus was sounded to 8 cms    The cervix was serially dilated with conrad dilators with no difficulty.   Conrad dilators were then used to serially dilate the endocervical canal sufficiently to admit the diagnostic hysteroscope.    Using sterile water, the hysteroscope was used to visualize the endometrial cavity in its entirety.  Both tubal ostia were seen, the cavity was smooth, and no masses were visualized; the endometrial cavity appeared thickened..  The hysteroscope was removed.    A small serrated curette was used to curette out the entire endometrial cavity.  This specimen was sent for permanent section.      All instruments were removed.  There was no evidence of cervical laceration or uterine perforation.    All sponge, instrument counts were correct x 3 according to the OR personnel.  Pt went to  in satisfactory condition.      Yogi Patten MD  13:40 EDT  03/17/22

## 2022-03-17 NOTE — H&P
PREOPERATIVE HISTORY AND PHYSICAL      Patient Care Team:  Kelly Doll MD as PCP - General (Family Medicine)    Chief complaint: Menometrorrhagia    Pt is a 41 y.o.   No LMP recorded.     HPI:Pt here for evaluation of menometrorrhagia.  Pt intolerant to sonohystogram in office. Pt had normal u/s.      PMHx:   Past Medical History:   Diagnosis Date   • Anxiety    • Excessive vaginal bleeding     scheduled for d&c   • Paroxysmal atrial fibrillation (HCC)     no problems since 2018   • PCOS (polycystic ovarian syndrome)        Current problem list:  Patient Active Problem List   Diagnosis   • Other acne   • PCOS (polycystic ovarian syndrome)   • Pelvic pain   • hx: ASCUS with positive high risk HPV cervical   • Morbid obesity with BMI of 40.0-44.9, adult (HCC)   • Menometrorrhagia   • Endometrial polyp   • Family history of breast cancer in mother       PSHx:   Past Surgical History:   Procedure Laterality Date   • D & C HYSTEROSCOPY N/A 2020    Procedure: DILATATION AND CURETTAGE HYSTEROSCOPY;  Surgeon: Yogi Patten MD;  Location: [facility];  Service: Obstetrics/Gynecology;  Laterality: N/A;   • KNEE ARTHROSCOPY Left        Social Hx:   Social History     Socioeconomic History   • Marital status: Single   Tobacco Use   • Smoking status: Never Smoker   • Smokeless tobacco: Never Used   Substance and Sexual Activity   • Alcohol use: Yes     Alcohol/week: 2.0 standard drinks     Types: 2 Glasses of wine per week     Comment: weekly   • Drug use: Never   • Sexual activity: Defer       FHx:   Family History   Problem Relation Age of Onset   • Cancer Mother    • Atrial fibrillation Father    • Mental illness Brother        Debilities/Disabilities Identified: None    Emotional Behavior: Appropriate    PGyn Hx:  otherwise noncontributory    POBHx:   OB History    Para Term  AB Living   0 0 0 0 0 0   SAB IAB Ectopic Molar Multiple Live Births   0 0 0 0 0 0        Allergies: Sulfa antibiotics    Medications:   No medications prior to admission.                            No current facility-administered medications for this encounter.    Current Outpatient Medications:   •  Cetirizine HCl (ZyrTEC Allergy) 10 MG capsule, 10 mg Every Morning., Disp: , Rfl:   •  minocycline (DYNACIN) 50 MG tablet, Take 50 mg by mouth Every Morning. For acne, Disp: , Rfl:   •  multivitamin with minerals tablet tablet, Take 1 tablet by mouth Daily., Disp: , Rfl:   •  cetirizine (zyrTEC) 10 MG tablet, Take 10 mg by mouth Daily., Disp: , Rfl:   •  FLUoxetine (PROzac) 10 MG capsule, Take 10 mg by mouth Every Night., Disp: , Rfl:         Review of Systems   Constitutional: Negative.    HENT: Negative.    Eyes: Negative.    Respiratory: Negative.    Cardiovascular: Negative.    Gastrointestinal: Negative.    Endocrine: Negative.    Genitourinary: Positive for menstrual problem.   Musculoskeletal: Negative.    Skin: Negative.    Allergic/Immunologic: Negative.    Neurological: Negative.    Hematological: Negative.    Psychiatric/Behavioral: Negative.        Vital Signs  There were no vitals taken for this visit.    Physical Exam  Vitals and nursing note reviewed.   Constitutional:       Appearance: She is well-developed.   HENT:      Head: Normocephalic and atraumatic.   Cardiovascular:      Rate and Rhythm: Normal rate.   Pulmonary:      Effort: Pulmonary effort is normal.   Abdominal:      General: There is no distension.      Palpations: Abdomen is soft. There is no mass.      Tenderness: There is no abdominal tenderness. There is no guarding.   Genitourinary:     Vagina: No vaginal discharge.   Musculoskeletal:         General: No tenderness or deformity. Normal range of motion.      Cervical back: Normal range of motion.   Skin:     General: Skin is warm and dry.      Coloration: Skin is not pale.      Findings: No erythema or rash.   Neurological:      Mental Status: She is alert and  oriented to person, place, and time.   Psychiatric:         Behavior: Behavior normal.         Thought Content: Thought content normal.         Judgment: Judgment normal.             IMPRESSION:    Menometrorrhagia                                    PLAN:    Procedure(s):  DILATATION AND CURETTAGE HYSTEROSCOPY    RISKS, ALTERNATIVES, COMPLICATIONS OF THE PROCEDURE INCLUDING BUT NOT LIMITED TO:    INTRAOPERATIVE RISKS: INJURY TO INTERNAL AND ADJACENT ORGANS AND STRUCTURES (BOWEL, BLADDER, URETER,BLOOD VESSELS) OR HEMORRHAGE REQUIRING FURTHER SURGERY (LAPAROTOMY),  POSSIBLE NON-DIAGNOSTIC FINDINGS, DISCOVERY OF POSSIBLE MALIGNANCY, INFECTION, AND DEATH;   POSTOP COMPLICATIONS: BLEEDING, INFECTION (REQUIRING POSSIBLE REOPERATION), FAILURE OF GOAL OF SURGERY AND RECURRENCE OF ORIGINAL SYMPTOMS, PNEUMONIA, PULMONARY EMBOLISM, AND DEATH;  WERE EXPLAINED TO THE PT WHO VERBALIZED HER UNDERSTANDING.             I discussed the patients findings and my recommendations with patient.     Yogi Patten MD  03/17/22  06:43 EDT

## 2022-03-21 LAB
LAB AP CASE REPORT: NORMAL
PATH REPORT.FINAL DX SPEC: NORMAL
PATH REPORT.GROSS SPEC: NORMAL

## 2022-03-30 ENCOUNTER — OFFICE VISIT (OUTPATIENT)
Dept: OBSTETRICS AND GYNECOLOGY | Facility: CLINIC | Age: 42
End: 2022-03-30

## 2022-03-30 VITALS
SYSTOLIC BLOOD PRESSURE: 148 MMHG | BODY MASS INDEX: 41.07 KG/M2 | HEIGHT: 68 IN | WEIGHT: 271 LBS | DIASTOLIC BLOOD PRESSURE: 88 MMHG

## 2022-03-30 DIAGNOSIS — N92.1 MENOMETRORRHAGIA: ICD-10-CM

## 2022-03-30 DIAGNOSIS — E28.2 PCOS (POLYCYSTIC OVARIAN SYNDROME): ICD-10-CM

## 2022-03-30 DIAGNOSIS — N84.0 ENDOMETRIAL POLYP: ICD-10-CM

## 2022-03-30 DIAGNOSIS — E66.01 MORBID OBESITY WITH BMI OF 40.0-44.9, ADULT: ICD-10-CM

## 2022-03-30 DIAGNOSIS — R10.2 PELVIC PAIN: ICD-10-CM

## 2022-03-30 DIAGNOSIS — Z80.3 FAMILY HISTORY OF BREAST CANCER IN MOTHER: ICD-10-CM

## 2022-03-30 DIAGNOSIS — Z87.42 HISTORY OF MENORRHAGIA: Primary | ICD-10-CM

## 2022-03-30 LAB
BILIRUB BLD-MCNC: NEGATIVE MG/DL
CLARITY, POC: CLEAR
COLOR UR: YELLOW
GLUCOSE UR STRIP-MCNC: NEGATIVE MG/DL
KETONES UR QL: NEGATIVE
LEUKOCYTE EST, POC: NEGATIVE
NITRITE UR-MCNC: NEGATIVE MG/ML
PH UR: 5 [PH] (ref 5–8)
PROT UR STRIP-MCNC: NEGATIVE MG/DL
RBC # UR STRIP: ABNORMAL /UL
SP GR UR: 1 (ref 1–1.03)
UROBILINOGEN UR QL: NORMAL

## 2022-03-30 PROCEDURE — 99214 OFFICE O/P EST MOD 30 MIN: CPT | Performed by: OBSTETRICS & GYNECOLOGY

## 2022-03-30 PROCEDURE — 81002 URINALYSIS NONAUTO W/O SCOPE: CPT | Performed by: OBSTETRICS & GYNECOLOGY

## 2022-03-30 NOTE — PROGRESS NOTES
POSTOP VISIT    Patient Care Team:  Kelly Doll MD as PCP - General (Family Medicine)  -----------------------------------------------------HISTORY---------------------------------------------------    Chief Complaint: Pt here for postop check      41 y.o.  Patient's last menstrual period was 2022.    HPI:  Pt here for postop check for procedure: Dilatation And Curettage Hysteroscopy completed on date: 3/17/2022  Pt reports complaints: still bleeding.  Tolerating diet well, normal bladder and bowel function, incision/s healing well.  Vaginal bleeding? yes    PFSH:   1.    Past Medical History:   Diagnosis Date   • Anxiety    • Excessive vaginal bleeding     scheduled for d&c   • Paroxysmal atrial fibrillation (HCC)     no problems since 2018   • PCOS (polycystic ovarian syndrome)      2.   Family History   Problem Relation Age of Onset   • Cancer Mother    • Atrial fibrillation Father    • Mental illness Brother          PAST HISTORY REVIEWED:  1.   Past Surgical History:   Procedure Laterality Date   • D & C HYSTEROSCOPY N/A 2020    Procedure: DILATATION AND CURETTAGE HYSTEROSCOPY;  Surgeon: Yogi Patten MD;  Location: [facility];  Service: Obstetrics/Gynecology;  Laterality: N/A;   • D & C HYSTEROSCOPY N/A 3/17/2022    Procedure: DILATATION AND CURETTAGE HYSTEROSCOPY;  Surgeon: Yogi Patten MD;  Location: [facility];  Service: Obstetrics/Gynecology;  Laterality: N/A;   • KNEE ARTHROSCOPY Left       2.   Current Outpatient Medications:   •  cetirizine (zyrTEC) 10 MG tablet, Take 10 mg by mouth Daily., Disp: , Rfl:   •  Cetirizine HCl (ZyrTEC Allergy) 10 MG capsule, 10 mg Every Morning., Disp: , Rfl:   •  FLUoxetine (PROzac) 10 MG capsule, Take 10 mg by mouth Every Night., Disp: , Rfl:   •  minocycline (DYNACIN) 50 MG tablet, Take 50 mg by mouth Every Morning. For acne, Disp: , Rfl:   •  multivitamin with minerals tablet tablet, Take 1 tablet by  "mouth Daily., Disp: , Rfl:   3.   Allergies   Allergen Reactions   • Sulfa Antibiotics Hives       ROS:  Review of Systems:    -----------------------------------------------PHYSICAL EXAM----------------------------------------------    Vital Signs: /88   Ht 172.7 cm (67.99\")   Wt 123 kg (271 lb)   LMP 03/08/2022   Breastfeeding No   BMI 41.22 kg/m²    Flowsheet Rows    Flowsheet Row First Filed Value   Admission Height 172.7 cm (67.99\") Documented at 03/30/2022 1319   Admission Weight 123 kg (271 lb) Documented at 03/30/2022 1319          Physical Exam    -----------------------------------------------MEDICAL DECISION MAKING-----------------------------      DATA Review & labs ordered:     1.   Lab Results (last 24 hours)     Procedure Component Value Units Date/Time    POC Urinalysis Dipstick [[id number]]  (Abnormal) Collected: 03/30/22 1320    Specimen: Urine Updated: 03/30/22 1321     Color Yellow     Clarity, UA Clear     Glucose, UA Negative mg/dL      Bilirubin Negative     Ketones, UA Negative     Specific Gravity  1.005     Blood, UA Trace     pH, Urine 5.0     Protein, POC Negative mg/dL      Urobilinogen, UA Normal     Leukocytes Negative     Nitrite, UA Negative        2.   Imaging Results (Last 24 Hours)     ** No results found for the last 24 hours. **        3.   ECG/EMG Results (most recent)     None               Diagnoses and all orders for this visit:    1. History of menorrhagia (Primary)  -     POC Urinalysis Dipstick    2. Morbid obesity with BMI of 40.0-44.9, adult (HCC)  -     Case Request; Standing  -     COVID PRE-OP / PRE-PROCEDURE SCREENING ORDER (NO ISOLATION) - Swab, Nasopharynx; Future  -     CBC and Differential; Future  -     Case Request    3. PCOS (polycystic ovarian syndrome)    4. Family history of breast cancer in mother    5. Pelvic pain    6. Endometrial polyp  -     Case Request; Standing  -     COVID PRE-OP / PRE-PROCEDURE SCREENING ORDER (NO ISOLATION) - Swab, " Nasopharynx; Future  -     CBC and Differential; Future  -     Case Request    7. Menometrorrhagia  -     Case Request; Standing  -     COVID PRE-OP / PRE-PROCEDURE SCREENING ORDER (NO ISOLATION) - Swab, Nasopharynx; Future  -     CBC and Differential; Future  -     Case Request    Other orders  -     Follow Anesthesia Guidelines / Standing Orders; Future  -     Chlorhexidine Skin Prep; Future      9. Risk counseling done:  yes  10. Ultrasound ordered and reviewed?   no  11.  Path report reviewed? Yes, benign    IMPRESSION & DIAGNOSIS:      Menometrorrhagia.  Adequate D&C.      PLAN:     Dx hysteroscopy, D&C, Novasure endometrial ablation.    RISKS, ALTERNATIVES, COMPLICATIONS OF THE PROCEDURE INCLUDING BUT NOT LIMITED TO:    INTRAOPERATIVE RISKS: INJURY TO INTERNAL AND ADJACENT ORGANS AND STRUCTURES (BOWEL, BLADDER, URETER,BLOOD VESSELS) OR HEMORRHAGE REQUIRING FURTHER SURGERY (LAPAROTOMY),  POSSIBLE NON-DIAGNOSTIC FINDINGS, DISCOVERY OF POSSIBLE MALIGNANCY, INFECTION, AND DEATH;   POSTOP COMPLICATIONS: BLEEDING, INFECTION (REQUIRING POSSIBLE REOPERATION), FAILURE OF GOAL OF SURGERY AND RECURRENCE OF ORIGINAL SYMPTOMS, PNEUMONIA, PULMONARY EMBOLISM, AND DEATH;  WERE EXPLAINED TO THE PT WHO VERBALIZED HER UNDERSTANDING.          RTO Return in about 4 weeks (around 4/27/2022) for postop check. FOR: postop check      I spent 30+ minutes caring for Jo on this date of service. This time includes time spent by me in the following activities: preparing for the visit, reviewing tests, obtaining and/or reviewing a separately obtained history, performing a medically appropriate examination and/or evaluation, counseling and educating the patient/family/caregiver, ordering medications, tests, or procedures, referring and communicating with other health care professionals, documenting information in the medical record, independently interpreting results and communicating that information with the patient/family/caregiver  and care coordination      Yogi Patten MD  13:42 EDT  03/30/22

## 2022-04-18 NOTE — PAT
Spoke with pt via phone . Pt recently here 3-17-22 for procedure. No changes to health history or medications reported. Pt has CHG soap instructed to bathe the night before and morning of surgery. Pt given NPO instructions and clears 2 hrs. Prior to arrival. Covid to be done 4-19-22. Pt verbalized understanding with instructions given.

## 2022-04-19 ENCOUNTER — LAB (OUTPATIENT)
Dept: LAB | Facility: HOSPITAL | Age: 42
End: 2022-04-19

## 2022-04-19 DIAGNOSIS — N92.1 MENOMETRORRHAGIA: ICD-10-CM

## 2022-04-19 DIAGNOSIS — N84.0 ENDOMETRIAL POLYP: ICD-10-CM

## 2022-04-19 DIAGNOSIS — E66.01 MORBID OBESITY WITH BMI OF 40.0-44.9, ADULT: ICD-10-CM

## 2022-04-19 LAB — SARS-COV-2 RNA PNL SPEC NAA+PROBE: NOT DETECTED

## 2022-04-19 PROCEDURE — 87635 SARS-COV-2 COVID-19 AMP PRB: CPT

## 2022-04-19 PROCEDURE — C9803 HOPD COVID-19 SPEC COLLECT: HCPCS

## 2022-04-20 ENCOUNTER — ANESTHESIA EVENT (OUTPATIENT)
Dept: PERIOP | Facility: HOSPITAL | Age: 42
End: 2022-04-20

## 2022-04-21 ENCOUNTER — HOSPITAL ENCOUNTER (OUTPATIENT)
Dept: MAMMOGRAPHY | Facility: HOSPITAL | Age: 42
End: 2022-04-21

## 2022-04-21 ENCOUNTER — ANESTHESIA (OUTPATIENT)
Dept: PERIOP | Facility: HOSPITAL | Age: 42
End: 2022-04-21

## 2022-04-21 ENCOUNTER — HOSPITAL ENCOUNTER (OUTPATIENT)
Facility: HOSPITAL | Age: 42
Setting detail: HOSPITAL OUTPATIENT SURGERY
Discharge: HOME | End: 2022-04-21
Attending: OBSTETRICS & GYNECOLOGY | Admitting: OBSTETRICS & GYNECOLOGY

## 2022-04-21 VITALS
TEMPERATURE: 98.1 F | WEIGHT: 273.2 LBS | OXYGEN SATURATION: 97 % | BODY MASS INDEX: 41.55 KG/M2 | RESPIRATION RATE: 12 BRPM | DIASTOLIC BLOOD PRESSURE: 74 MMHG | HEART RATE: 66 BPM | SYSTOLIC BLOOD PRESSURE: 131 MMHG

## 2022-04-21 DIAGNOSIS — N92.1 MENOMETRORRHAGIA: ICD-10-CM

## 2022-04-21 DIAGNOSIS — N84.0 ENDOMETRIAL POLYP: ICD-10-CM

## 2022-04-21 DIAGNOSIS — E66.01 MORBID OBESITY WITH BMI OF 40.0-44.9, ADULT: ICD-10-CM

## 2022-04-21 DIAGNOSIS — Z98.890 S/P ENDOMETRIAL ABLATION: Primary | ICD-10-CM

## 2022-04-21 LAB
BASOPHILS # BLD AUTO: 0.03 10*3/MM3 (ref 0–0.2)
BASOPHILS NFR BLD AUTO: 0.5 % (ref 0–1.5)
DEPRECATED RDW RBC AUTO: 40.9 FL (ref 37–54)
EOSINOPHIL # BLD AUTO: 0.17 10*3/MM3 (ref 0–0.4)
EOSINOPHIL NFR BLD AUTO: 2.7 % (ref 0.3–6.2)
ERYTHROCYTE [DISTWIDTH] IN BLOOD BY AUTOMATED COUNT: 12.4 % (ref 12.3–15.4)
HCG SERPL QL: NEGATIVE
HCT VFR BLD AUTO: 38.7 % (ref 34–46.6)
HGB BLD-MCNC: 12.5 G/DL (ref 12–15.9)
IMM GRANULOCYTES # BLD AUTO: 0.04 10*3/MM3 (ref 0–0.05)
IMM GRANULOCYTES NFR BLD AUTO: 0.6 % (ref 0–0.5)
LYMPHOCYTES # BLD AUTO: 2.51 10*3/MM3 (ref 0.7–3.1)
LYMPHOCYTES NFR BLD AUTO: 40.2 % (ref 19.6–45.3)
MCH RBC QN AUTO: 29.3 PG (ref 26.6–33)
MCHC RBC AUTO-ENTMCNC: 32.3 G/DL (ref 31.5–35.7)
MCV RBC AUTO: 90.6 FL (ref 79–97)
MONOCYTES # BLD AUTO: 0.61 10*3/MM3 (ref 0.1–0.9)
MONOCYTES NFR BLD AUTO: 9.8 % (ref 5–12)
NEUTROPHILS NFR BLD AUTO: 2.88 10*3/MM3 (ref 1.7–7)
NEUTROPHILS NFR BLD AUTO: 46.2 % (ref 42.7–76)
NRBC BLD AUTO-RTO: 0 /100 WBC (ref 0–0.2)
PLATELET # BLD AUTO: 319 10*3/MM3 (ref 140–450)
PMV BLD AUTO: 9.7 FL (ref 6–12)
RBC # BLD AUTO: 4.27 10*6/MM3 (ref 3.77–5.28)
WBC NRBC COR # BLD: 6.24 10*3/MM3 (ref 3.4–10.8)

## 2022-04-21 PROCEDURE — 25010000002 HYDROMORPHONE 1 MG/ML SOLUTION: Performed by: NURSE ANESTHETIST, CERTIFIED REGISTERED

## 2022-04-21 PROCEDURE — 58563 HYSTEROSCOPY ABLATION: CPT | Performed by: OBSTETRICS & GYNECOLOGY

## 2022-04-21 PROCEDURE — 88305 TISSUE EXAM BY PATHOLOGIST: CPT | Performed by: OBSTETRICS & GYNECOLOGY

## 2022-04-21 PROCEDURE — 85025 COMPLETE CBC W/AUTO DIFF WBC: CPT | Performed by: OBSTETRICS & GYNECOLOGY

## 2022-04-21 PROCEDURE — 25010000002 MIDAZOLAM PER 1MG: Performed by: NURSE ANESTHETIST, CERTIFIED REGISTERED

## 2022-04-21 PROCEDURE — 84703 CHORIONIC GONADOTROPIN ASSAY: CPT | Performed by: NURSE ANESTHETIST, CERTIFIED REGISTERED

## 2022-04-21 PROCEDURE — 25010000002 PROPOFOL 10 MG/ML EMULSION: Performed by: NURSE ANESTHETIST, CERTIFIED REGISTERED

## 2022-04-21 PROCEDURE — 25010000002 DEXAMETHASONE PER 1 MG: Performed by: NURSE ANESTHETIST, CERTIFIED REGISTERED

## 2022-04-21 PROCEDURE — 25010000002 ONDANSETRON PER 1 MG: Performed by: NURSE ANESTHETIST, CERTIFIED REGISTERED

## 2022-04-21 PROCEDURE — 25010000002 KETOROLAC TROMETHAMINE PER 15 MG: Performed by: NURSE ANESTHETIST, CERTIFIED REGISTERED

## 2022-04-21 RX ORDER — PROPOFOL 10 MG/ML
VIAL (ML) INTRAVENOUS CONTINUOUS PRN
Status: DISCONTINUED | OUTPATIENT
Start: 2022-04-21 | End: 2022-04-21 | Stop reason: SURG

## 2022-04-21 RX ORDER — SODIUM CHLORIDE, SODIUM LACTATE, POTASSIUM CHLORIDE, CALCIUM CHLORIDE 600; 310; 30; 20 MG/100ML; MG/100ML; MG/100ML; MG/100ML
9 INJECTION, SOLUTION INTRAVENOUS CONTINUOUS
Status: DISCONTINUED | OUTPATIENT
Start: 2022-04-21 | End: 2022-04-21 | Stop reason: HOSPADM

## 2022-04-21 RX ORDER — LIDOCAINE HYDROCHLORIDE 20 MG/ML
INJECTION, SOLUTION INFILTRATION; PERINEURAL AS NEEDED
Status: DISCONTINUED | OUTPATIENT
Start: 2022-04-21 | End: 2022-04-21 | Stop reason: SURG

## 2022-04-21 RX ORDER — OXYCODONE HYDROCHLORIDE AND ACETAMINOPHEN 5; 325 MG/1; MG/1
1 TABLET ORAL EVERY 6 HOURS PRN
Qty: 10 TABLET | Refills: 0 | Status: SHIPPED | OUTPATIENT
Start: 2022-04-21

## 2022-04-21 RX ORDER — GLYCOPYRROLATE 0.2 MG/ML
INJECTION INTRAMUSCULAR; INTRAVENOUS AS NEEDED
Status: DISCONTINUED | OUTPATIENT
Start: 2022-04-21 | End: 2022-04-21 | Stop reason: SURG

## 2022-04-21 RX ORDER — FAMOTIDINE 10 MG/ML
20 INJECTION, SOLUTION INTRAVENOUS
Status: DISCONTINUED | OUTPATIENT
Start: 2022-04-21 | End: 2022-04-21 | Stop reason: HOSPADM

## 2022-04-21 RX ORDER — DEXAMETHASONE SODIUM PHOSPHATE 4 MG/ML
4 INJECTION, SOLUTION INTRA-ARTICULAR; INTRALESIONAL; INTRAMUSCULAR; INTRAVENOUS; SOFT TISSUE ONCE AS NEEDED
Status: COMPLETED | OUTPATIENT
Start: 2022-04-21 | End: 2022-04-21

## 2022-04-21 RX ORDER — KETAMINE HYDROCHLORIDE 10 MG/ML
INJECTION INTRAMUSCULAR; INTRAVENOUS AS NEEDED
Status: DISCONTINUED | OUTPATIENT
Start: 2022-04-21 | End: 2022-04-21 | Stop reason: SURG

## 2022-04-21 RX ORDER — ONDANSETRON 2 MG/ML
4 INJECTION INTRAMUSCULAR; INTRAVENOUS ONCE AS NEEDED
Status: DISCONTINUED | OUTPATIENT
Start: 2022-04-21 | End: 2022-04-21 | Stop reason: HOSPADM

## 2022-04-21 RX ORDER — SODIUM CHLORIDE 0.9 % (FLUSH) 0.9 %
10 SYRINGE (ML) INJECTION EVERY 12 HOURS SCHEDULED
Status: DISCONTINUED | OUTPATIENT
Start: 2022-04-21 | End: 2022-04-21 | Stop reason: HOSPADM

## 2022-04-21 RX ORDER — SODIUM CHLORIDE 9 MG/ML
40 INJECTION, SOLUTION INTRAVENOUS AS NEEDED
Status: DISCONTINUED | OUTPATIENT
Start: 2022-04-21 | End: 2022-04-21 | Stop reason: HOSPADM

## 2022-04-21 RX ORDER — SODIUM CHLORIDE 0.9 % (FLUSH) 0.9 %
10 SYRINGE (ML) INJECTION AS NEEDED
Status: DISCONTINUED | OUTPATIENT
Start: 2022-04-21 | End: 2022-04-21 | Stop reason: HOSPADM

## 2022-04-21 RX ORDER — MAGNESIUM HYDROXIDE 1200 MG/15ML
LIQUID ORAL AS NEEDED
Status: DISCONTINUED | OUTPATIENT
Start: 2022-04-21 | End: 2022-04-21 | Stop reason: HOSPADM

## 2022-04-21 RX ORDER — KETOROLAC TROMETHAMINE 30 MG/ML
INJECTION, SOLUTION INTRAMUSCULAR; INTRAVENOUS AS NEEDED
Status: DISCONTINUED | OUTPATIENT
Start: 2022-04-21 | End: 2022-04-21 | Stop reason: SURG

## 2022-04-21 RX ORDER — MIDAZOLAM HYDROCHLORIDE 2 MG/2ML
1 INJECTION, SOLUTION INTRAMUSCULAR; INTRAVENOUS
Status: DISCONTINUED | OUTPATIENT
Start: 2022-04-21 | End: 2022-04-21 | Stop reason: HOSPADM

## 2022-04-21 RX ORDER — ACETAMINOPHEN 500 MG
1000 TABLET ORAL ONCE
Status: COMPLETED | OUTPATIENT
Start: 2022-04-21 | End: 2022-04-21

## 2022-04-21 RX ORDER — PROPOFOL 10 MG/ML
VIAL (ML) INTRAVENOUS AS NEEDED
Status: DISCONTINUED | OUTPATIENT
Start: 2022-04-21 | End: 2022-04-21 | Stop reason: SURG

## 2022-04-21 RX ORDER — IBUPROFEN 800 MG/1
800 TABLET ORAL EVERY 8 HOURS PRN
Qty: 30 TABLET | Refills: 0 | Status: SHIPPED | OUTPATIENT
Start: 2022-04-21

## 2022-04-21 RX ORDER — SODIUM CHLORIDE 9 MG/ML
INJECTION, SOLUTION INTRAVENOUS AS NEEDED
Status: DISCONTINUED | OUTPATIENT
Start: 2022-04-21 | End: 2022-04-21 | Stop reason: HOSPADM

## 2022-04-21 RX ORDER — FENTANYL CITRATE 50 UG/ML
50 INJECTION, SOLUTION INTRAMUSCULAR; INTRAVENOUS
Status: DISCONTINUED | OUTPATIENT
Start: 2022-04-21 | End: 2022-04-21 | Stop reason: HOSPADM

## 2022-04-21 RX ORDER — ONDANSETRON 2 MG/ML
4 INJECTION INTRAMUSCULAR; INTRAVENOUS ONCE
Status: COMPLETED | OUTPATIENT
Start: 2022-04-21 | End: 2022-04-21

## 2022-04-21 RX ORDER — OXYCODONE HYDROCHLORIDE AND ACETAMINOPHEN 5; 325 MG/1; MG/1
1 TABLET ORAL ONCE AS NEEDED
Status: COMPLETED | OUTPATIENT
Start: 2022-04-21 | End: 2022-04-21

## 2022-04-21 RX ORDER — LIDOCAINE HYDROCHLORIDE 10 MG/ML
0.5 INJECTION, SOLUTION EPIDURAL; INFILTRATION; INTRACAUDAL; PERINEURAL ONCE AS NEEDED
Status: DISCONTINUED | OUTPATIENT
Start: 2022-04-21 | End: 2022-04-21 | Stop reason: HOSPADM

## 2022-04-21 RX ADMIN — KETOROLAC TROMETHAMINE 30 MG: 30 INJECTION, SOLUTION INTRAMUSCULAR; INTRAVENOUS at 08:22

## 2022-04-21 RX ADMIN — Medication 80 MG: at 08:04

## 2022-04-21 RX ADMIN — FAMOTIDINE 20 MG: 10 INJECTION INTRAVENOUS at 06:52

## 2022-04-21 RX ADMIN — SODIUM CHLORIDE, POTASSIUM CHLORIDE, SODIUM LACTATE AND CALCIUM CHLORIDE 9 ML/HR: 600; 310; 30; 20 INJECTION, SOLUTION INTRAVENOUS at 07:31

## 2022-04-21 RX ADMIN — MIDAZOLAM HYDROCHLORIDE 1 MG: 1 INJECTION, SOLUTION INTRAMUSCULAR; INTRAVENOUS at 07:56

## 2022-04-21 RX ADMIN — HYDROMORPHONE HYDROCHLORIDE 0.25 MG: 1 INJECTION, SOLUTION INTRAMUSCULAR; INTRAVENOUS; SUBCUTANEOUS at 08:37

## 2022-04-21 RX ADMIN — Medication 80 MG: at 08:20

## 2022-04-21 RX ADMIN — GLYCOPYRROLATE 0.1 MG: 0.2 INJECTION INTRAMUSCULAR; INTRAVENOUS at 08:04

## 2022-04-21 RX ADMIN — DEXAMETHASONE SODIUM PHOSPHATE 4 MG: 4 INJECTION, SOLUTION INTRAMUSCULAR; INTRAVENOUS at 06:53

## 2022-04-21 RX ADMIN — OXYCODONE HYDROCHLORIDE AND ACETAMINOPHEN 1 TABLET: 5; 325 TABLET ORAL at 08:49

## 2022-04-21 RX ADMIN — ACETAMINOPHEN 1000 MG: 500 TABLET ORAL at 06:52

## 2022-04-21 RX ADMIN — KETAMINE HYDROCHLORIDE 25 MG: 10 INJECTION, SOLUTION INTRAMUSCULAR; INTRAVENOUS at 08:04

## 2022-04-21 RX ADMIN — PROPOFOL 80 MCG/KG/MIN: 10 INJECTION, EMULSION INTRAVENOUS at 08:04

## 2022-04-21 RX ADMIN — LIDOCAINE HYDROCHLORIDE 100 MG: 20 INJECTION, SOLUTION INFILTRATION; PERINEURAL at 08:04

## 2022-04-21 RX ADMIN — ONDANSETRON 4 MG: 2 INJECTION INTRAMUSCULAR; INTRAVENOUS at 06:52

## 2022-04-21 NOTE — ANESTHESIA PREPROCEDURE EVALUATION
Anesthesia Evaluation     Patient summary reviewed and Nursing notes reviewed   no history of anesthetic complications:  NPO Solid Status: > 8 hours  NPO Liquid Status: > 8 hours           Airway   Mallampati: II  TM distance: <3 FB  Neck ROM: full  No difficulty expected  Dental - normal exam     Pulmonary - normal exam   (-) shortness of breath    ROS comment: Chronic Allergies  Cardiovascular - normal exam  Exercise tolerance: good (4-7 METS)    ECG reviewed    (+) dysrhythmias (8yrs ago) Paroxysmal Atrial Fib,   (-) angina      Neuro/Psych  (+) headaches (1+ month ago), psychiatric history Anxiety,    GI/Hepatic/Renal/Endo    (+) obesity, morbid obesity,      Musculoskeletal (-) negative ROS    Abdominal   (+) obese,    Substance History   (+) alcohol use (socially),      OB/GYN negative ob/gyn ROS     Comment: PCOS      Other                        Anesthesia Plan    ASA 3     MAC     intravenous induction     Anesthetic plan, all risks, benefits, and alternatives have been provided, discussed and informed consent has been obtained with: patient.  Use of blood products discussed with patient  Consented to blood products.       CODE STATUS:

## 2022-04-21 NOTE — OP NOTE
OPERATIVE REPORT    PROCEDURE:   DX HYSTEROSCOPY, DILATATION & CURETTAGE, NOVASURE ENDOMETRIAL ABLATION    PREOP DIAGNOSIS:  menometrorrhagia    POSTOP DIAGNOSIS:  same    SURGEON:   Earline    ANESTHESIA:  MAC    EBL:   1cc    IVFS:  500cc    URINE OUTPUT:  50cc    COMPLICATIONS:  none    FINDINGS:  Normal endometrial cavity    SPECIMENS:  Endometrial curettings    ANTIBIOTICS:  None        DESCRIPTION OF THE PROCEDURE:     After informed consent was obtained, pt was taken to the OR and placed on the table in a DORSOLITHOTOMY position.  LMA was induced without difficulty.  Time out was done, no antibiotics were given. Pt was prepped and draped in the usual fashion.    An open graves speculum was placed in the vagina and the anterior lip of the cervix was grasped with a single toothed tenaculum.  The cervix appeared normal.    The cervix was serially dilated with conrad dilators with no difficulty. The uterus was sounded to 9 cms.  A serrated curette was used to curette out the entire endometrial cavity.  The conrad dilators were used to serially dilate the endocervical canal sufficiently to admit the diagnostic hysteroscope.    Using sterile saline, the hysteroscope was used to visualize the endometrial cavity in its entirety.  Both tubal ostia were seen, the cavity was smooth, and no masses were visualized.  The hysteroscope was removed.    The Novasure endometrial ablation apparatus was then placed in the endometrial cavity per protocol and using the settings: 5.0cms x 3.6cms x 99W x 106s, an ablation was performed without problems.  The apparatus was removed and the dx hysteroscopy was reinserted and using sterile saline the endometrial cavity was revisualized in its entirety.  There appeared to be a good devin and no evidence of injury or complication.       All instruments were removed.  There was no evidence of cervical laceration or uterine perforation.    All sponge, instrument counts were correct x 3  according to the OR personnel.  Pt went to RR in satisfactory condition.      Yogi Patten MD  08:31 EDT  04/21/22

## 2022-04-21 NOTE — H&P
PREOPERATIVE HISTORY AND PHYSICAL      Patient Care Team:  Kelly Doll MD as PCP - General (Family Medicine)    Chief complaint: Menometrorrhagia    Pt is a 41 y.o.   Patient's last menstrual period was 2022.     HPI:Pt here for ablation for abnormal bleeding. Pt had a dx hyst, D&C 3/17 w/ neg pathology.  Pt candidate for ablation.        PMHx:   Past Medical History:   Diagnosis Date   • Anxiety    • Excessive vaginal bleeding     scheduled for d&c   • Paroxysmal atrial fibrillation (HCC)     no problems since 2018   • PCOS (polycystic ovarian syndrome)        Current problem list:  Patient Active Problem List   Diagnosis   • Other acne   • PCOS (polycystic ovarian syndrome)   • Pelvic pain   • hx: ASCUS with positive high risk HPV cervical   • Morbid obesity with BMI of 40.0-44.9, adult (HCC)   • Menometrorrhagia   • Endometrial polyp   • Family history of breast cancer in mother       PSHx:   Past Surgical History:   Procedure Laterality Date   • D & C HYSTEROSCOPY N/A 2020    Procedure: DILATATION AND CURETTAGE HYSTEROSCOPY;  Surgeon: Yogi Patten MD;  Location: [facility];  Service: Obstetrics/Gynecology;  Laterality: N/A;   • D & C HYSTEROSCOPY N/A 3/17/2022    Procedure: DILATATION AND CURETTAGE HYSTEROSCOPY;  Surgeon: Yogi Patten MD;  Location: [facility];  Service: Obstetrics/Gynecology;  Laterality: N/A;   • KNEE ARTHROSCOPY Left        Social Hx:   Social History     Socioeconomic History   • Marital status: Single   Tobacco Use   • Smoking status: Never Smoker   • Smokeless tobacco: Never Used   Vaping Use   • Vaping Use: Never used   Substance and Sexual Activity   • Alcohol use: Yes     Alcohol/week: 2.0 standard drinks     Types: 2 Glasses of wine per week     Comment: weekly   • Drug use: Never   • Sexual activity: Defer       FHx:   Family History   Problem Relation Age of Onset   • Cancer Mother    • Atrial fibrillation Father     • Mental illness Brother        Debilities/Disabilities Identified: None    Emotional Behavior: Appropriate    PGyn Hx:  otherwise noncontributory    POBHx:   OB History    Para Term  AB Living   0 0 0 0 0 0   SAB IAB Ectopic Molar Multiple Live Births   0 0 0 0 0 0       Allergies: Sulfa antibiotics    Medications:   Medications Prior to Admission   Medication Sig Dispense Refill Last Dose   • Cetirizine HCl (ZyrTEC Allergy) 10 MG capsule 10 mg Every Morning.   Past Week at Unknown time   • FLUoxetine (PROzac) 10 MG capsule Take 10 mg by mouth Every Night.   2022 at Unknown time   • minocycline (DYNACIN) 50 MG tablet Take 50 mg by mouth Every Morning. For acne   2022 at Unknown time   • multivitamin with minerals tablet tablet Take 1 tablet by mouth Daily.   2022                              Current Facility-Administered Medications:   •  famotidine (PEPCID) injection 20 mg, 20 mg, Intravenous, 60 Min Pre-Op, Sriram Valderrama CRNA, 20 mg at 22 0652  •  lactated ringers infusion, 9 mL/hr, Intravenous, Continuous, Sriram Valderrama CRNA  •  lidocaine PF 1% (XYLOCAINE) injection 0.5 mL, 0.5 mL, Injection, Once PRN, Sriram Valderrama CRNA  •  Midazolam HCl (PF) (VERSED) injection 1 mg, 1 mg, Intravenous, Q5 Min PRN, Sriram Valderrama CRNA  •  sodium chloride 0.9 % flush 10 mL, 10 mL, Intravenous, PRN, Sriram Valderrama CRNA  •  sodium chloride 0.9 % flush 10 mL, 10 mL, Intravenous, Q12H, Sriram Valderrama CRNA  •  sodium chloride 0.9 % infusion 40 mL, 40 mL, Intravenous, PRN, Sriram Valderrama CRNA        Review of Systems   Constitutional: Negative.    HENT: Negative.    Eyes: Negative.    Respiratory: Negative.    Cardiovascular: Negative.    Gastrointestinal: Negative.    Endocrine: Negative.    Musculoskeletal: Negative.    Skin: Negative.    Allergic/Immunologic: Negative.    Neurological: Negative.    Hematological: Negative.     Psychiatric/Behavioral: Negative.        Vital Signs  /89 (BP Location: Left arm, Patient Position: Lying)   Pulse 79   Temp 98.2 °F (36.8 °C) (Oral)   Resp 24   Wt 124 kg (273 lb 3.2 oz)   LMP 04/09/2022   SpO2 95%   BMI 41.55 kg/m²     Physical Exam  Vitals and nursing note reviewed.   Constitutional:       Appearance: She is well-developed.   HENT:      Head: Normocephalic and atraumatic.   Cardiovascular:      Rate and Rhythm: Normal rate.   Pulmonary:      Effort: Pulmonary effort is normal.   Abdominal:      General: There is no distension.      Palpations: Abdomen is soft. There is no mass.      Tenderness: There is no abdominal tenderness. There is no guarding.   Genitourinary:     Vagina: No vaginal discharge.   Musculoskeletal:         General: No tenderness or deformity. Normal range of motion.      Cervical back: Normal range of motion.   Skin:     General: Skin is warm and dry.      Coloration: Skin is not pale.      Findings: No erythema or rash.   Neurological:      Mental Status: She is alert and oriented to person, place, and time.   Psychiatric:         Behavior: Behavior normal.         Thought Content: Thought content normal.         Judgment: Judgment normal.             IMPRESSION:    Menometrorrhagia                                    PLAN:    Procedure(s):  DILATATION AND CURETTAGE HYSTEROSCOPY,  NOVASURE ENDOMETRIAL ABLATION    RISKS, ALTERNATIVES, COMPLICATIONS OF THE PROCEDURE INCLUDING BUT NOT LIMITED TO:    INTRAOPERATIVE RISKS: INJURY TO INTERNAL AND ADJACENT ORGANS AND STRUCTURES (BOWEL, BLADDER, URETER,BLOOD VESSELS) OR HEMORRHAGE REQUIRING FURTHER SURGERY (LAPAROTOMY),  POSSIBLE NON-DIAGNOSTIC FINDINGS, DISCOVERY OF POSSIBLE MALIGNANCY, INFECTION, AND DEATH;   POSTOP COMPLICATIONS: BLEEDING, INFECTION (REQUIRING POSSIBLE REOPERATION), FAILURE OF GOAL OF SURGERY AND RECURRENCE OF ORIGINAL SYMPTOMS, PNEUMONIA, PULMONARY EMBOLISM, AND DEATH;  WERE EXPLAINED TO THE PT WHO  VERBALIZED HER UNDERSTANDING.             I discussed the patients findings and my recommendations with patient and family.     Yogi Patten MD  04/21/22  07:16 EDT

## 2022-04-21 NOTE — ANESTHESIA POSTPROCEDURE EVALUATION
Patient: Tamara Holstein    Procedure Summary     Date: 04/21/22 Room / Location: [facility] OR 1 /  [facility] OR    Anesthesia Start: 0800 Anesthesia Stop: 0834    Procedure: DILATATION AND CURETTAGE HYSTEROSCOPY,  NOVASURE ENDOMETRIAL ABLATION (N/A Vagina) Diagnosis:       Morbid obesity with BMI of 40.0-44.9, adult (HCC)      Endometrial polyp      Menometrorrhagia      (Morbid obesity with BMI of 40.0-44.9, adult (HCC) [E66.01, Z68.41])      (Endometrial polyp [N84.0])      (Menometrorrhagia [N92.1])    Surgeons: Yogi Patten MD Provider: Felipe Wiley CRNA    Anesthesia Type: MAC ASA Status: 3          Anesthesia Type: MAC    Vitals  Vitals Value Taken Time   /80 04/21/22 0850   Temp 98.1 °F (36.7 °C) 04/21/22 0850   Pulse 73 04/21/22 0850   Resp 24 04/21/22 0850   SpO2 94 % 04/21/22 0850           Post Anesthesia Care and Evaluation    Patient location during evaluation: PHASE II  Patient participation: complete - patient participated  Level of consciousness: awake and alert  Pain score: 0  Pain management: adequate  Airway patency: patent  Anesthetic complications: No anesthetic complications  PONV Status: none  Cardiovascular status: acceptable  Respiratory status: acceptable  Hydration status: acceptable

## 2022-04-22 LAB
LAB AP CASE REPORT: NORMAL
PATH REPORT.FINAL DX SPEC: NORMAL
PATH REPORT.GROSS SPEC: NORMAL

## 2022-05-10 ENCOUNTER — OFFICE VISIT (OUTPATIENT)
Dept: OBSTETRICS AND GYNECOLOGY | Facility: CLINIC | Age: 42
End: 2022-05-10

## 2022-05-10 VITALS
SYSTOLIC BLOOD PRESSURE: 138 MMHG | DIASTOLIC BLOOD PRESSURE: 72 MMHG | HEIGHT: 68 IN | WEIGHT: 277 LBS | BODY MASS INDEX: 41.98 KG/M2

## 2022-05-10 DIAGNOSIS — Z09 POSTOPERATIVE FOLLOW-UP: Primary | ICD-10-CM

## 2022-05-10 DIAGNOSIS — Z98.890 S/P ENDOMETRIAL ABLATION: ICD-10-CM

## 2022-05-10 DIAGNOSIS — E66.01 MORBID OBESITY WITH BMI OF 40.0-44.9, ADULT: ICD-10-CM

## 2022-05-10 DIAGNOSIS — Z09 POSTOP CHECK: ICD-10-CM

## 2022-05-10 PROCEDURE — 99213 OFFICE O/P EST LOW 20 MIN: CPT | Performed by: OBSTETRICS & GYNECOLOGY

## 2022-05-10 PROCEDURE — 81002 URINALYSIS NONAUTO W/O SCOPE: CPT | Performed by: OBSTETRICS & GYNECOLOGY

## 2022-05-10 RX ORDER — AZITHROMYCIN 250 MG/1
TABLET, FILM COATED ORAL
COMMUNITY
Start: 2022-04-04

## 2022-05-10 NOTE — PROGRESS NOTES
"EVALUATION AND MANAGEMENT ENCOUNTER    Tamara Holstein  Patient new to examiner? {yes and no:76975}  New problem to examiner? {yes and no:32862}  Patient referred? {yes and no:61928}    -----------------------------------------------------HISTORY---------------------------------------------------    Chief Complaint:   Chief Complaint   Patient presents with   • Postpartum Care   • Post-op       HPI:  Tamara Holstein is a 41 y.o.  with No LMP recorded. here for  ***.    1. Location: ***      2. Severity:  ***      3.  Quality:  ***      4. Modifying factors:  ***      5.  Associated signs/symptoms:  ***      Pt denies:  ***    History of Present Illness     Tamara Holstein  reports that she has never smoked. She has never used smokeless tobacco.. I have educated her on the risk of diseases from using tobacco products such as {Tobacco Cessation Diseases:30443::\"cancer\",\"COPD\",\"heart disease\"}.     I advised her to quit and she is {Willing/Not Willing to Quit Tobacco Products:06438}.    I spent {Time Spent Tobacco :40720} minutes counseling the patient.           ROS:  Review of Systems:    ***Patient reports that she is not currently experiencing any symptoms of urinary incontinence.      ***TESTED FOR CHLAMYDIA?  -----------------------------------------------PHYSICAL EXAM----------------------------------------------    Vital Signs: /72   Ht 172.7 cm (67.99\")   Wt 126 kg (277 lb)   Breastfeeding No   BMI 42.13 kg/m²    Flowsheet Rows    Flowsheet Row First Filed Value   Admission Height 172.7 cm (67.99\") Documented at 05/10/2022 1317   Admission Weight 126 kg (277 lb) Documented at 05/10/2022 1317          Physical Exam    I saw the patient with a face mask, gloves and eye protection  The patient herself was masked.  Social distancing was observed as appropriate. All COVID precautions observed.     -----------------------------------------------MEDICAL DECISION " MAKING-----------------------------        DATA Review & labs ordered:     1.   Lab Results (last 24 hours)     Procedure Component Value Units Date/Time    POC Urinalysis Dipstick [[id number]]  (Normal) Collected: 05/10/22 1318    Specimen: Urine Updated: 05/10/22 1319     Color Yellow     Clarity, UA Clear     Glucose, UA Negative mg/dL      Bilirubin Negative     Ketones, UA Negative     Specific Gravity  1.005     Blood, UA Negative     pH, Urine 5.0     Protein, POC Negative mg/dL      Urobilinogen, UA Normal     Leukocytes Negative     Nitrite, UA Negative        2.   Imaging Results (Last 24 Hours)     ** No results found for the last 24 hours. **        3.   ECG/EMG Results (most recent)     None        4. Old records reviewed? {yes and no:75750}  5. Old records ordered?  {yes and no:13677}  6. Labs ordered?: Yes:  urinalysis: ***, & ***  7. Imaging other than ultrasound ordered?: {yes and no:19539}        Reviewed with other physician? ***     8. Ultrasound ordered and reviewed? {yes and no:67106}  9. Diagnoses and/or chronic conditions reviewed:      Diagnoses and all orders for this visit:    1. Postoperative follow-up (Primary)  -     POC Urinalysis Dipstick    2. S/P endometrial ablation: 4/21/22        IMPRESSION/PROBLEM:      ***    (Established problem/s? {yes and no:01192}, worsening? {yes and no:19067})    (New Problem/s? {yes and no:94192}, additional workup planned? {yes and no:25604})      PLAN:     1.***  2.***      Pt instructed to call for results of any testing done today and that failure to call if she has not heard from us could result in a bad outcome.  Pt verbalized her understanding.     RTO Return in about 1 year (around 5/10/2023) for Annual physical. FOR ***.  Instructions and precautions given.     @2021@    Yogi Patten MD  13:27 EDT  05/10/22

## 2022-05-10 NOTE — PROGRESS NOTES
POSTOP VISIT    Patient Care Team:  Kelly Doll MD as PCP - General (Family Medicine)  -----------------------------------------------------HISTORY---------------------------------------------------    Chief Complaint: Pt here for postop check      41 y.o.  No LMP recorded.    HPI:  Pt here for postop check for procedure: Dilatation And Curettage Hysteroscopy,  Novasure Endometrial Ablation completed on date: 2022  Pt reports complaints: some discharge.  Tolerating diet well, normal bladder and bowel function, incision/s healing well.  Vaginal bleeding? no    PFSH:   1.    Past Medical History:   Diagnosis Date   • Anxiety    • Excessive vaginal bleeding     scheduled for d&c   • Paroxysmal atrial fibrillation (HCC)     no problems since 2018   • PCOS (polycystic ovarian syndrome)      2.   Family History   Problem Relation Age of Onset   • Cancer Mother    • Atrial fibrillation Father    • Mental illness Brother          PAST HISTORY REVIEWED:  1.   Past Surgical History:   Procedure Laterality Date   • D & C HYSTEROSCOPY N/A 2020    Procedure: DILATATION AND CURETTAGE HYSTEROSCOPY;  Surgeon: Yogi Patten MD;  Location: [facility];  Service: Obstetrics/Gynecology;  Laterality: N/A;   • D & C HYSTEROSCOPY N/A 3/17/2022    Procedure: DILATATION AND CURETTAGE HYSTEROSCOPY;  Surgeon: Yogi Patten MD;  Location: [facility];  Service: Obstetrics/Gynecology;  Laterality: N/A;   • D & C HYSTEROSCOPY ENDOMETRIAL ABLATION N/A 2022    Procedure: DILATATION AND CURETTAGE HYSTEROSCOPY,  NOVASURE ENDOMETRIAL ABLATION;  Surgeon: Yogi Patten MD;  Location: [facility];  Service: Obstetrics/Gynecology;  Laterality: N/A;   • KNEE ARTHROSCOPY Left       2.   Current Outpatient Medications:   •  azithromycin (ZITHROMAX) 250 MG tablet, , Disp: , Rfl:   •  Cetirizine HCl (ZyrTEC Allergy) 10 MG capsule, 10 mg Every Morning., Disp: , Rfl:   •  FLUoxetine  "(PROzac) 10 MG capsule, Take 10 mg by mouth Every Night., Disp: , Rfl:   •  ibuprofen (ADVIL,MOTRIN) 800 MG tablet, Take 1 tablet by mouth Every 8 (Eight) Hours As Needed for Mild Pain., Disp: 30 tablet, Rfl: 0  •  minocycline (DYNACIN) 50 MG tablet, Take 50 mg by mouth Every Morning. For acne, Disp: , Rfl:   •  multivitamin with minerals tablet tablet, Take 1 tablet by mouth Daily., Disp: , Rfl:   •  oxyCODONE-acetaminophen (Percocet) 5-325 MG per tablet, Take 1 tablet by mouth Every 6 (Six) Hours As Needed for Severe Pain., Disp: 10 tablet, Rfl: 0  3.   Allergies   Allergen Reactions   • Sulfa Antibiotics Hives       ROS:  Review of Systems   Constitutional: Negative.    HENT: Negative.    Eyes: Negative.    Respiratory: Negative.    Cardiovascular: Negative.    Gastrointestinal: Negative.    Endocrine: Negative.    Musculoskeletal: Negative.    Skin: Negative.    Allergic/Immunologic: Negative.    Neurological: Negative.    Hematological: Negative.    Psychiatric/Behavioral: Negative.    :    -----------------------------------------------PHYSICAL EXAM----------------------------------------------    Vital Signs: /72   Ht 172.7 cm (67.99\")   Wt 126 kg (277 lb)   Breastfeeding No   BMI 42.13 kg/m²    Flowsheet Rows    Flowsheet Row First Filed Value   Admission Height 172.7 cm (67.99\") Documented at 05/10/2022 1317   Admission Weight 126 kg (277 lb) Documented at 05/10/2022 1317          Physical Exam  Vitals and nursing note reviewed.   Constitutional:       Appearance: She is well-developed.   HENT:      Head: Normocephalic and atraumatic.   Cardiovascular:      Rate and Rhythm: Normal rate.   Pulmonary:      Effort: Pulmonary effort is normal.   Abdominal:      General: There is no distension.      Palpations: Abdomen is soft. There is no mass.      Tenderness: There is no abdominal tenderness. There is no guarding.   Genitourinary:     Vagina: No vaginal discharge.   Musculoskeletal:         General: " No tenderness or deformity. Normal range of motion.      Cervical back: Normal range of motion.   Skin:     General: Skin is warm and dry.      Coloration: Skin is not pale.      Findings: No erythema or rash.   Neurological:      Mental Status: She is alert and oriented to person, place, and time.   Psychiatric:         Behavior: Behavior normal.         Thought Content: Thought content normal.         Judgment: Judgment normal.         -----------------------------------------------MEDICAL DECISION MAKING-----------------------------      DATA Review & labs ordered:     1.   Lab Results (last 24 hours)     Procedure Component Value Units Date/Time    POC Urinalysis Dipstick [[id number]]  (Normal) Collected: 05/10/22 1318    Specimen: Urine Updated: 05/10/22 1319     Color Yellow     Clarity, UA Clear     Glucose, UA Negative mg/dL      Bilirubin Negative     Ketones, UA Negative     Specific Gravity  1.005     Blood, UA Negative     pH, Urine 5.0     Protein, POC Negative mg/dL      Urobilinogen, UA Normal     Leukocytes Negative     Nitrite, UA Negative        2.   Imaging Results (Last 24 Hours)     ** No results found for the last 24 hours. **        3.   ECG/EMG Results (most recent)     None        4. Old records reviewed?  yes  5. Old records ordered?  no  6. Labs ordered?: ua  7. Imaging other than ultrasound ordered?: no  8. Diagnoses and/or chronic conditions reviewed with pt:       Diagnoses and all orders for this visit:    1. Postoperative follow-up (Primary)  -     POC Urinalysis Dipstick    2. S/P endometrial ablation: 4/21/22    3. Morbid obesity with BMI of 40.0-44.9, adult (HCC)    4. Postop check      9. Risk counseling done:  yes  10. Ultrasound ordered and reviewed?   no  11.  Path report reviewed? yes    IMPRESSION & DIAGNOSIS:      Doing well postop.  Some discharge.  Restrictions lifted.      PLAN:     RTO 1 yr.     RTO Return in about 1 year (around 5/10/2023) for Annual physical.      I  spent 20+ minutes caring for Jo on this date of service. This time includes time spent by me in the following activities: preparing for the visit, reviewing tests, obtaining and/or reviewing a separately obtained history, performing a medically appropriate examination and/or evaluation, counseling and educating the patient/family/caregiver, ordering medications, tests, or procedures, referring and communicating with other health care professionals, documenting information in the medical record, independently interpreting results and communicating that information with the patient/family/caregiver and care coordination      Yogi Patten MD  13:55 EDT  05/10/22

## 2022-09-17 ENCOUNTER — HOSPITAL ENCOUNTER (OUTPATIENT)
Facility: HOSPITAL | Age: 42
Discharge: HOME | End: 2022-09-17
Attending: EMERGENCY MEDICINE

## 2022-09-17 VITALS
OXYGEN SATURATION: 94 % | HEIGHT: 68 IN | RESPIRATION RATE: 18 BRPM | HEART RATE: 79 BPM | SYSTOLIC BLOOD PRESSURE: 138 MMHG | WEIGHT: 245 LBS | TEMPERATURE: 97.7 F | DIASTOLIC BLOOD PRESSURE: 78 MMHG | BODY MASS INDEX: 37.13 KG/M2

## 2022-09-17 DIAGNOSIS — J01.11 ACUTE RECURRENT FRONTAL SINUSITIS: Primary | ICD-10-CM

## 2022-09-17 LAB
FLUAV SUBTYP SPEC NAA+PROBE: NOT DETECTED
FLUBV RNA ISLT QL NAA+PROBE: NOT DETECTED
SARS-COV-2 RNA RESP QL NAA+PROBE: NOT DETECTED

## 2022-09-17 PROCEDURE — 87636 SARSCOV2 & INF A&B AMP PRB: CPT | Performed by: EMERGENCY MEDICINE

## 2022-09-17 PROCEDURE — EDLOS: Performed by: EMERGENCY MEDICINE

## 2022-09-17 PROCEDURE — 99203 OFFICE O/P NEW LOW 30 MIN: CPT | Performed by: EMERGENCY MEDICINE

## 2022-09-17 PROCEDURE — G0463 HOSPITAL OUTPT CLINIC VISIT: HCPCS | Performed by: EMERGENCY MEDICINE

## 2022-09-17 RX ORDER — AMOXICILLIN AND CLAVULANATE POTASSIUM 875; 125 MG/1; MG/1
1 TABLET, FILM COATED ORAL 2 TIMES DAILY
Qty: 20 TABLET | Refills: 0 | Status: SHIPPED | OUTPATIENT
Start: 2022-09-17

## 2024-05-02 ENCOUNTER — TRANSCRIBE ORDERS (OUTPATIENT)
Dept: ADMINISTRATIVE | Facility: HOSPITAL | Age: 44
End: 2024-05-02
Payer: COMMERCIAL

## 2024-05-02 ENCOUNTER — OFFICE (OUTPATIENT)
Dept: URBAN - METROPOLITAN AREA CLINIC 76 | Facility: CLINIC | Age: 44
End: 2024-05-02

## 2024-05-02 VITALS
DIASTOLIC BLOOD PRESSURE: 82 MMHG | HEART RATE: 77 BPM | HEIGHT: 68 IN | WEIGHT: 282 LBS | SYSTOLIC BLOOD PRESSURE: 131 MMHG

## 2024-05-02 DIAGNOSIS — E66.9 OBESITY, UNSPECIFIED: ICD-10-CM

## 2024-05-02 DIAGNOSIS — Q45.3 OTHER CONGENITAL MALFORMATIONS OF PANCREAS AND PANCREATIC DU: ICD-10-CM

## 2024-05-02 DIAGNOSIS — Q45.3 PANCREATIC ABNORMALITY: Primary | ICD-10-CM

## 2024-05-02 DIAGNOSIS — R10.13 EPIGASTRIC PAIN: ICD-10-CM

## 2024-05-02 DIAGNOSIS — R11.2 NAUSEA WITH VOMITING, UNSPECIFIED: ICD-10-CM

## 2024-05-02 PROCEDURE — 99204 OFFICE O/P NEW MOD 45 MIN: CPT | Performed by: INTERNAL MEDICINE

## 2024-05-02 RX ORDER — OMEPRAZOLE 20 MG/1
20 CAPSULE, DELAYED RELEASE ORAL
Qty: 30 | Refills: 11 | Status: ACTIVE
Start: 2024-05-02

## 2024-05-21 ENCOUNTER — HOSPITAL ENCOUNTER (OUTPATIENT)
Dept: MRI IMAGING | Facility: HOSPITAL | Age: 44
Discharge: HOME | End: 2024-05-21
Admitting: INTERNAL MEDICINE
Payer: COMMERCIAL

## 2024-05-21 DIAGNOSIS — Q45.3 PANCREATIC ABNORMALITY: ICD-10-CM

## 2024-05-21 LAB
CREAT BLDA-MCNC: 0.7 MG/DL (ref 0.6–1.3)
EGFRCR SERPLBLD CKD-EPI 2021: 108.8 ML/MIN/1.73

## 2024-05-21 PROCEDURE — 74183 MRI ABD W/O CNTR FLWD CNTR: CPT

## 2024-05-21 PROCEDURE — A9579 GAD-BASE MR CONTRAST NOS,1ML: HCPCS | Performed by: INTERNAL MEDICINE

## 2024-05-21 PROCEDURE — 82565 ASSAY OF CREATININE: CPT

## 2024-05-21 PROCEDURE — 25010000002 GADOTERIDOL PER 1 ML: Performed by: INTERNAL MEDICINE

## 2024-05-21 RX ADMIN — GADOTERIDOL 20 ML: 279.3 INJECTION, SOLUTION INTRAVENOUS at 07:29

## 2024-08-27 ENCOUNTER — OFFICE (OUTPATIENT)
Age: 44
End: 2024-08-27

## 2024-08-27 ENCOUNTER — OFFICE (OUTPATIENT)
Dept: URBAN - METROPOLITAN AREA CLINIC 76 | Facility: CLINIC | Age: 44
End: 2024-08-27

## (undated) DEVICE — CYSTO/BLADDER IRRIGATION SET, REGULATING CLAMP

## (undated) DEVICE — LAG PERI GYN: Brand: MEDLINE INDUSTRIES, INC.

## (undated) DEVICE — CURETTE ENDOMTRL SXN

## (undated) DEVICE — STRAP STIRUP SLP RNG 19X3.5IN DISP

## (undated) DEVICE — GLV SURG SENSICARE W/ALOE PF LF 7.5 STRL

## (undated) DEVICE — 1000ML,PRESSURE INFUSER W/STOPCOCK: Brand: MEDLINE

## (undated) DEVICE — PROB ABL ENDOMTRL NOVASURE/G4 IMPEDENCE 1P/U

## (undated) DEVICE — TUBING, SUCTION, 1/4" X 12', STRAIGHT: Brand: MEDLINE

## (undated) DEVICE — GOWN,PREVENTION PLUS,XXLARGE,STERILE: Brand: MEDLINE